# Patient Record
Sex: FEMALE | Race: WHITE | Employment: OTHER | URBAN - NONMETROPOLITAN AREA
[De-identification: names, ages, dates, MRNs, and addresses within clinical notes are randomized per-mention and may not be internally consistent; named-entity substitution may affect disease eponyms.]

---

## 2017-01-12 LAB
BASOPHILS ABSOLUTE: NORMAL /ΜL
BASOPHILS RELATIVE PERCENT: 0.94 %
EOSINOPHILS ABSOLUTE: NORMAL /ΜL
EOSINOPHILS RELATIVE PERCENT: 1.26 %
HCT VFR BLD CALC: 42 % (ref 36–46)
HEMOGLOBIN: 13.5 G/DL (ref 12–16)
LYMPHOCYTES ABSOLUTE: NORMAL /ΜL
LYMPHOCYTES RELATIVE PERCENT: 16.1 %
MCH RBC QN AUTO: 29.6 PG
MCHC RBC AUTO-ENTMCNC: 32.2 G/DL
MCV RBC AUTO: 92.2 FL
MONOCYTES ABSOLUTE: NORMAL /ΜL
MONOCYTES RELATIVE PERCENT: 0.63 %
NEUTROPHILS ABSOLUTE: NORMAL /ΜL
NEUTROPHILS RELATIVE PERCENT: 6.29 %
PDW BLD-RTO: 13.6 %
PLATELET # BLD: 196 K/ΜL
PMV BLD AUTO: 9.1 FL
RBC # BLD: 4.55 10^6/ΜL
WBC # BLD: 8.47 10^3/ML

## 2017-07-18 ENCOUNTER — TELEPHONE (OUTPATIENT)
Dept: FAMILY MEDICINE CLINIC | Age: 82
End: 2017-07-18

## 2017-07-18 ENCOUNTER — OFFICE VISIT (OUTPATIENT)
Dept: FAMILY MEDICINE CLINIC | Age: 82
End: 2017-07-18
Payer: MEDICARE

## 2017-07-18 VITALS
TEMPERATURE: 99.6 F | WEIGHT: 127 LBS | HEIGHT: 63 IN | OXYGEN SATURATION: 90 % | DIASTOLIC BLOOD PRESSURE: 80 MMHG | SYSTOLIC BLOOD PRESSURE: 110 MMHG | BODY MASS INDEX: 22.5 KG/M2

## 2017-07-18 DIAGNOSIS — E78.6 LIPOPROTEIN DEFICIENCIES: ICD-10-CM

## 2017-07-18 DIAGNOSIS — R05.9 COUGH: Primary | ICD-10-CM

## 2017-07-18 DIAGNOSIS — I10 ESSENTIAL HYPERTENSION: ICD-10-CM

## 2017-07-18 DIAGNOSIS — M85.9 DISORDER OF BONE DENSITY AND STRUCTURE, UNSPECIFIED: ICD-10-CM

## 2017-07-18 DIAGNOSIS — I50.1 LEFT VENTRICULAR FAILURE (HCC): ICD-10-CM

## 2017-07-18 DIAGNOSIS — H73.012 BULLOUS MYRINGITIS OF LEFT EAR: ICD-10-CM

## 2017-07-18 PROCEDURE — G8420 CALC BMI NORM PARAMETERS: HCPCS | Performed by: FAMILY MEDICINE

## 2017-07-18 PROCEDURE — 4040F PNEUMOC VAC/ADMIN/RCVD: CPT | Performed by: FAMILY MEDICINE

## 2017-07-18 PROCEDURE — 1036F TOBACCO NON-USER: CPT | Performed by: FAMILY MEDICINE

## 2017-07-18 PROCEDURE — 1090F PRES/ABSN URINE INCON ASSESS: CPT | Performed by: FAMILY MEDICINE

## 2017-07-18 PROCEDURE — G8427 DOCREV CUR MEDS BY ELIG CLIN: HCPCS | Performed by: FAMILY MEDICINE

## 2017-07-18 PROCEDURE — 1123F ACP DISCUSS/DSCN MKR DOCD: CPT | Performed by: FAMILY MEDICINE

## 2017-07-18 PROCEDURE — 99214 OFFICE O/P EST MOD 30 MIN: CPT | Performed by: FAMILY MEDICINE

## 2017-07-18 RX ORDER — VALSARTAN 80 MG/1
80 TABLET ORAL DAILY
COMMUNITY
End: 2017-09-09 | Stop reason: SDUPTHER

## 2017-07-18 RX ORDER — AZITHROMYCIN 250 MG/1
TABLET, FILM COATED ORAL
Qty: 1 PACKET | Refills: 0 | Status: SHIPPED | OUTPATIENT
Start: 2017-07-18 | End: 2017-07-24 | Stop reason: ALTCHOICE

## 2017-07-18 RX ORDER — METOPROLOL TARTRATE 50 MG/1
25 TABLET, FILM COATED ORAL 2 TIMES DAILY
COMMUNITY
End: 2018-02-19 | Stop reason: CLARIF

## 2017-07-18 RX ORDER — SPIRONOLACTONE 50 MG/1
25 TABLET, FILM COATED ORAL 2 TIMES DAILY
COMMUNITY
End: 2019-06-03 | Stop reason: ALTCHOICE

## 2017-07-18 RX ORDER — OMEPRAZOLE 20 MG/1
20 CAPSULE, DELAYED RELEASE ORAL DAILY
COMMUNITY
End: 2017-08-08 | Stop reason: SDUPTHER

## 2017-07-18 RX ORDER — ACETAMINOPHEN,DIPHENHYDRAMINE HCL 500; 25 MG/1; MG/1
1 TABLET, FILM COATED ORAL NIGHTLY PRN
COMMUNITY

## 2017-07-18 RX ORDER — COVID-19 ANTIGEN TEST
220 KIT MISCELLANEOUS 2 TIMES DAILY
COMMUNITY

## 2017-07-18 RX ORDER — BENZONATATE 100 MG/1
100 CAPSULE ORAL 3 TIMES DAILY PRN
Qty: 30 CAPSULE | Refills: 0 | Status: SHIPPED | OUTPATIENT
Start: 2017-07-18 | End: 2019-01-01

## 2017-07-18 RX ORDER — MESALAMINE 400 MG/1
CAPSULE, DELAYED RELEASE ORAL
COMMUNITY
Start: 2017-06-29 | End: 2018-04-02 | Stop reason: SDUPTHER

## 2017-07-18 ASSESSMENT — PATIENT HEALTH QUESTIONNAIRE - PHQ9
2. FEELING DOWN, DEPRESSED OR HOPELESS: 0
SUM OF ALL RESPONSES TO PHQ QUESTIONS 1-9: 0
1. LITTLE INTEREST OR PLEASURE IN DOING THINGS: 0
SUM OF ALL RESPONSES TO PHQ9 QUESTIONS 1 & 2: 0

## 2017-07-18 ASSESSMENT — ENCOUNTER SYMPTOMS
WHEEZING: 1
SHORTNESS OF BREATH: 1
COUGH: 1

## 2017-07-20 ENCOUNTER — TELEPHONE (OUTPATIENT)
Dept: FAMILY MEDICINE CLINIC | Age: 82
End: 2017-07-20

## 2017-07-24 ENCOUNTER — OFFICE VISIT (OUTPATIENT)
Dept: FAMILY MEDICINE CLINIC | Age: 82
End: 2017-07-24
Payer: MEDICARE

## 2017-07-24 VITALS
HEART RATE: 60 BPM | SYSTOLIC BLOOD PRESSURE: 130 MMHG | BODY MASS INDEX: 21.79 KG/M2 | WEIGHT: 123 LBS | DIASTOLIC BLOOD PRESSURE: 60 MMHG | TEMPERATURE: 98.9 F

## 2017-07-24 DIAGNOSIS — R05.9 COUGH: Primary | ICD-10-CM

## 2017-07-24 DIAGNOSIS — H73.012 BULLOUS MYRINGITIS OF LEFT EAR: ICD-10-CM

## 2017-07-24 PROCEDURE — 1036F TOBACCO NON-USER: CPT | Performed by: FAMILY MEDICINE

## 2017-07-24 PROCEDURE — 1090F PRES/ABSN URINE INCON ASSESS: CPT | Performed by: FAMILY MEDICINE

## 2017-07-24 PROCEDURE — 4040F PNEUMOC VAC/ADMIN/RCVD: CPT | Performed by: FAMILY MEDICINE

## 2017-07-24 PROCEDURE — 99212 OFFICE O/P EST SF 10 MIN: CPT | Performed by: FAMILY MEDICINE

## 2017-07-24 PROCEDURE — G8420 CALC BMI NORM PARAMETERS: HCPCS | Performed by: FAMILY MEDICINE

## 2017-07-24 PROCEDURE — 1123F ACP DISCUSS/DSCN MKR DOCD: CPT | Performed by: FAMILY MEDICINE

## 2017-07-24 PROCEDURE — G8427 DOCREV CUR MEDS BY ELIG CLIN: HCPCS | Performed by: FAMILY MEDICINE

## 2017-07-24 ASSESSMENT — ENCOUNTER SYMPTOMS
SHORTNESS OF BREATH: 1
COUGH: 1
WHEEZING: 0

## 2017-09-11 RX ORDER — VALSARTAN 80 MG/1
TABLET ORAL
Qty: 90 TABLET | Refills: 1 | Status: SHIPPED | OUTPATIENT
Start: 2017-09-11 | End: 2019-07-08 | Stop reason: ALTCHOICE

## 2017-12-02 LAB
AGE FOR GFR: 96
ANION GAP SERPL CALCULATED.3IONS-SCNC: 14 MMOL/L
APPEARANCE: ABNORMAL
BACTERIA: ABNORMAL 1HPF
BASOPHILS # BLD: 0.06 THOU/MM3
BILIRUBIN: ABNORMAL
BLOOD: ABNORMAL
BUN BLDV-MCNC: 22 MG/DL
CALCIUM SERPL-MCNC: 9.2 MG/DL
CASTS: ABNORMAL /LPF
CHLORIDE BLD-SCNC: 103 MMOL/L
CO2: 31 MMOL/L
COLOR: YELLOW
CREAT SERPL-MCNC: 1.2 MG/DL
CRYSTALS: ABNORMAL /HPF
DIFFERENTIAL: AUTOMATED DIFF
EGFR BF: 50 ML/MIN/1.73 M2
EGFR BM: 68 ML/MIN/1.73 M2
EGFR WF: 42 ML/MIN/1.73 M2
EGFR WM: 56 ML/MIN/1.73 M2
EOSINOPHIL # BLD: 0.15 THOU/MM3
EPITHELIAL CELLS, UA: ABNORMAL /HPF
GLUCOSE: 90 MG/DL
GLUCOSE: ABNORMAL MG/DL
HCT VFR BLD CALC: 36.5 %
HEMOGLOBIN: 12.1 G/DL
KETONES: ABNORMAL MG/DL
LEUKOCYTES, UA: ABNORMAL
LYMPHOCYTES # BLD: 1.28 THOU/MM3
MCH RBC QN AUTO: 31.2 PG
MCHC RBC AUTO-ENTMCNC: 33.1 G/DL
MCV RBC AUTO: 94.3 FL
MICROSCOPIC URINE: ABNORMAL
MONOCYTES # BLD: 0.55 THOU/MM3
MUCUS: ABNORMAL /HPF
NEUTROPHILS: 4.41 THOU/MM3
NITRITE, URINE: ABNORMAL
PDW BLD-RTO: 13.8 %
PH: 7.5 PH
PLATELET # BLD: 175 THOU/MM3
PMV BLD AUTO: 8.8 FL
POTASSIUM SERPL-SCNC: 4.7 MMOL/L
PROTEIN,SCREEN: ABNORMAL MG/DL
RBC # BLD: 3.87 M/UL
RBC: ABNORMAL /HPF
SODIUM BLD-SCNC: 143 MMOL/L
SPECIFIC GRAVITY, URINE: 1.01 MG/DL
URINE CULTURE, ROUTINE: NORMAL
UROBILINOGEN, URINE: 0.2 MG/DL
WBC # BLD: 6.45 THOU/ML3
WBC URINE: ABNORMAL
YEAST: ABNORMAL /HPF

## 2017-12-04 ENCOUNTER — TELEPHONE (OUTPATIENT)
Dept: FAMILY MEDICINE CLINIC | Age: 82
End: 2017-12-04

## 2017-12-04 NOTE — TELEPHONE ENCOUNTER
Leticia has a bed for [facility] whenever you are ready to discharge her. She will need discharge papers signed and Rx for Percocet. We are to contact Med Surg with when you plan to discharge her.

## 2017-12-04 NOTE — TELEPHONE ENCOUNTER
Discussed with anthony- discharge planning. Since they need written script not sent to pharmacy which could be accomplished we will arrange for discharge tomorrow.

## 2017-12-05 LAB
AGE FOR GFR: 96
CREAT SERPL-MCNC: 1.2 MG/DL
EGFR BF: 50 ML/MIN/1.73 M2
EGFR BM: 68 ML/MIN/1.73 M2
EGFR WF: 42 ML/MIN/1.73 M2
EGFR WM: 56 ML/MIN/1.73 M2

## 2018-01-01 LAB
APPEARANCE: ABNORMAL
BACTERIA: ABNORMAL 1HPF
BILIRUBIN: ABNORMAL
BLOOD: ABNORMAL
CASTS: ABNORMAL /LPF
COLOR: YELLOW
CRYSTALS: ABNORMAL /HPF
GLUCOSE: ABNORMAL MG/DL
KETONES: ABNORMAL MG/DL
LEUKOCYTES, UA: ABNORMAL
MICROSCOPIC URINE: ABNORMAL
MUCUS: ABNORMAL /HPF
NITRITE, URINE: ABNORMAL
PH: 6.5 PH
PROTEIN,SCREEN: ABNORMAL MG/DL
RBC: ABNORMAL /HPF
SPECIFIC GRAVITY, URINE: 1.01 MG/DL
URINE CULTURE, ROUTINE: NORMAL
UROBILINOGEN, URINE: 0.2 MG/DL
WBC URINE: ABNORMAL
YEAST: ABNORMAL /HPF

## 2018-02-05 ENCOUNTER — TELEPHONE (OUTPATIENT)
Dept: FAMILY MEDICINE CLINIC | Age: 83
End: 2018-02-05

## 2018-02-07 ENCOUNTER — TELEPHONE (OUTPATIENT)
Dept: FAMILY MEDICINE CLINIC | Age: 83
End: 2018-02-07

## 2018-02-19 ENCOUNTER — OFFICE VISIT (OUTPATIENT)
Dept: FAMILY MEDICINE CLINIC | Age: 83
End: 2018-02-19
Payer: MEDICARE

## 2018-02-19 VITALS — BODY MASS INDEX: 21.26 KG/M2 | SYSTOLIC BLOOD PRESSURE: 134 MMHG | DIASTOLIC BLOOD PRESSURE: 74 MMHG | WEIGHT: 120 LBS

## 2018-02-19 DIAGNOSIS — Z91.81 AT HIGH RISK FOR FALLS: ICD-10-CM

## 2018-02-19 DIAGNOSIS — S72.001D CLOSED FRACTURE OF RIGHT HIP WITH ROUTINE HEALING, SUBSEQUENT ENCOUNTER: Primary | ICD-10-CM

## 2018-02-19 DIAGNOSIS — I10 ESSENTIAL HYPERTENSION: ICD-10-CM

## 2018-02-19 DIAGNOSIS — N39.0 RECURRENT UTI: ICD-10-CM

## 2018-02-19 PROBLEM — S72.001A CLOSED FRACTURE OF RIGHT HIP (HCC): Status: ACTIVE | Noted: 2018-02-19

## 2018-02-19 PROCEDURE — 99495 TRANSJ CARE MGMT MOD F2F 14D: CPT | Performed by: FAMILY MEDICINE

## 2018-02-19 PROCEDURE — 1111F DSCHRG MED/CURRENT MED MERGE: CPT | Performed by: FAMILY MEDICINE

## 2018-02-19 RX ORDER — HYDROCODONE BITARTRATE AND ACETAMINOPHEN 5; 325 MG/1; MG/1
1 TABLET ORAL
Status: ON HOLD | COMMUNITY
End: 2018-04-24 | Stop reason: HOSPADM

## 2018-02-19 RX ORDER — LOSARTAN POTASSIUM 50 MG/1
50 TABLET ORAL DAILY
Qty: 90 TABLET | Refills: 1 | Status: SHIPPED | OUTPATIENT
Start: 2018-02-19 | End: 2018-06-29 | Stop reason: SDUPTHER

## 2018-02-19 RX ORDER — LOSARTAN POTASSIUM 50 MG/1
TABLET ORAL
COMMUNITY
Start: 2018-02-06 | End: 2018-02-19 | Stop reason: SDUPTHER

## 2018-02-19 RX ORDER — METOPROLOL SUCCINATE 50 MG/1
25 TABLET, EXTENDED RELEASE ORAL
COMMUNITY
End: 2018-06-29 | Stop reason: SDUPTHER

## 2018-02-19 ASSESSMENT — PATIENT HEALTH QUESTIONNAIRE - PHQ9
SUM OF ALL RESPONSES TO PHQ QUESTIONS 1-9: 2
1. LITTLE INTEREST OR PLEASURE IN DOING THINGS: 1
SUM OF ALL RESPONSES TO PHQ9 QUESTIONS 1 & 2: 2
2. FEELING DOWN, DEPRESSED OR HOPELESS: 1

## 2018-02-19 NOTE — PROGRESS NOTES
sleep, (TYLENOL PM EXTRA STRENGTH)  MG tablet Take 1 tablet by mouth nightly as needed for Sleep   Yes Historical Provider, MD   Multiple Vitamins-Minerals (PRESERVISION AREDS 2 PO) Take by mouth 2 times daily   Yes Historical Provider, MD   Naproxen Sodium (ALEVE) 220 MG CAPS Take 220 mg by mouth 2 times daily   Yes Historical Provider, MD   Polyethylene Glycol 3350 (MIRALAX PO) Take by mouth   Yes Historical Provider, MD   aspirin 81 MG tablet Take 81 mg by mouth daily   Yes Historical Provider, MD   DELZICOL 400 MG CPDR delayed release capsule  6/29/17  Yes Historical Provider, MD   valsartan (DIOVAN) 80 MG tablet TAKE 1 TABLET DAILY 9/11/17   Geronimo Duong MD   spironolactone (ALDACTONE) 50 MG tablet Take 25 mg by mouth 2 times daily    Historical Provider, MD   Prosthesis MISC 1 Units by Does not apply route Indications: Bilateral Breast Prostetics with bra 5/1/17   Geronimo Duong MD     Allergies   Allergen Reactions    Lotrel [Amlodipine Besy-Benazepril Hcl] Other (See Comments)     Cough       Health Maintenance   Topic Date Due    Potassium monitoring  12/02/2018    Creatinine monitoring  12/05/2018    DTaP/Tdap/Td vaccine (2 - Td) 10/16/2019    Zostavax vaccine  Completed    Flu vaccine  Completed    Pneumococcal low/med risk  Completed       Subjective:      Review of Systems   Constitutional:        Here for F/U after being in [address] [place] [address], for Rehab after hip fracture from fall at home. Currently has [facility] coming in with therapy, she enjoys having them there, kind of missing the nursing home people   Eyes: Positive for visual disturbance (Eye doctor has told her that there is not much he can do at this time). Genitourinary: Negative for frequency and urgency. She was treated twice since her fall for UTIs   Neurological: Negative for weakness.    Psychiatric/Behavioral:        Daughter reports increased memory issues       Objective:     /74   Wt 120 lb (54.4 kg) BMI 21.26 kg/m²     Physical Exam   Constitutional: She is oriented to person, place, and time. She appears well-developed and well-nourished. No distress. Using walker to ambulate   Neck: Neck supple. Cardiovascular: Normal rate and regular rhythm. No murmur heard. Pulmonary/Chest: Effort normal and breath sounds normal.   Musculoskeletal: She exhibits no edema. Lymphadenopathy:     She has no cervical adenopathy. Neurological: She is alert and oriented to person, place, and time. Assessment:     1. Closed fracture of right hip with routine healing, subsequent encounter    2. Recurrent UTI    3. Essential hypertension    4. At high risk for falls      No results found for this visit on 02/19/18. Quality & Risk Score Accuracy - MEDICARE ADVANTAGE    Visit Dx: Left heart failure (HCC)  Stable based upon symptoms and exam. Continue current treatment plan and follow up at least yearly. Last edited 02/19/18 12:59 EST by Eri Junior MD           Plan:     Orders Placed This Encounter   Procedures    AL DISCHARGE MEDS RECONCILED W/ CURRENT OUTPATIENT MED LIST       Orders Placed This Encounter   Medications    losartan (COZAAR) 50 MG tablet     Sig: Take 1 tablet by mouth daily     Dispense:  90 tablet     Refill:  1        Return in about 3 months (around 5/19/2018) for CHF, leg pain with fracture. .  Discussed use, benefit, and side effects of prescribed medications. All patient questions answered. Pt voiced understanding. Reviewed health maintenance. Instructed to continue current medications, diet and exercise. Patient agreed with treatment plan. Follow up as directed. Electronically signed by Eri Junior MD on 2/19/2018     On the basis of positive falls risk screening, assessment and plan is as follows: keep planned therapy at home.

## 2018-02-21 DIAGNOSIS — N39.0 RECURRENT UTI: Primary | ICD-10-CM

## 2018-02-21 LAB
APPEARANCE: ABNORMAL
BACTERIA: ABNORMAL 1HPF
BILIRUBIN: ABNORMAL
BLOOD: ABNORMAL
CASTS: ABNORMAL /LPF
COLOR: YELLOW
CRYSTALS: ABNORMAL /HPF
EPITHELIAL CELLS, UA: ABNORMAL /HPF
GLUCOSE: ABNORMAL MG/DL
KETONES: ABNORMAL MG/DL
LEUKOCYTES, UA: ABNORMAL
MICROSCOPIC URINE: ABNORMAL
MUCUS: ABNORMAL /HPF
NITRITE, URINE: ABNORMAL
PH: 6 PH
PROTEIN,SCREEN: ABNORMAL MG/DL
RBC: ABNORMAL /HPF
SPECIFIC GRAVITY, URINE: 1.02 MG/DL
URINE CULTURE, ROUTINE: NORMAL
UROBILINOGEN, URINE: 0.2 MG/DL
WBC URINE: ABNORMAL
YEAST: ABNORMAL /HPF

## 2018-02-23 ENCOUNTER — TELEPHONE (OUTPATIENT)
Dept: FAMILY MEDICINE CLINIC | Age: 83
End: 2018-02-23

## 2018-02-23 NOTE — TELEPHONE ENCOUNTER
Daughter called and wanted Andie Burden to be d/c from home health, she no longer wants to be homebound. She has not met her goals for PT/OT and family is aware of this.   She is being discharged

## 2018-02-26 ENCOUNTER — TELEPHONE (OUTPATIENT)
Dept: FAMILY MEDICINE CLINIC | Age: 83
End: 2018-02-26

## 2018-02-26 DIAGNOSIS — N39.0 RECURRENT UTI: Primary | ICD-10-CM

## 2018-02-26 DIAGNOSIS — N39.0 RECURRENT UTI: ICD-10-CM

## 2018-02-26 RX ORDER — SULFAMETHOXAZOLE AND TRIMETHOPRIM 800; 160 MG/1; MG/1
1 TABLET ORAL 2 TIMES DAILY
Qty: 20 TABLET | Refills: 0 | Status: SHIPPED | OUTPATIENT
Start: 2018-02-26 | End: 2019-01-01

## 2018-02-26 NOTE — TELEPHONE ENCOUNTER
Noted Valorie Doyle with multiple resistances as anticipated.   REcommend bactrim DS twice daily for 10 days and repeat urine culture in 3 weeks  thanks

## 2018-02-26 NOTE — PROGRESS NOTES
Noted, may review at planned f/u appt. Treated with culture sensativity showing multiple resistances.

## 2018-02-26 NOTE — TELEPHONE ENCOUNTER
Both daughters have called this AM wanting to make sure that her refills have all been taken care of and to ask about the Urine Culture from last week. Culture results were scanned into her chart. Daughters are requesting that Rx be sent to [facility] in [place].

## 2018-03-19 ENCOUNTER — TELEPHONE (OUTPATIENT)
Dept: FAMILY MEDICINE CLINIC | Age: 83
End: 2018-03-19

## 2018-03-19 DIAGNOSIS — N39.0 URINARY TRACT INFECTION WITHOUT HEMATURIA, SITE UNSPECIFIED: Primary | ICD-10-CM

## 2018-03-19 LAB — URINE CULTURE, ROUTINE: NORMAL

## 2018-03-19 NOTE — TELEPHONE ENCOUNTER
Per phone note 2/26/18 she was to repeat the urine culture in 3 weeks. Do you want just the culture or full ua as well.   She has \"been suffering all weekend\"

## 2018-03-21 ENCOUNTER — TELEPHONE (OUTPATIENT)
Dept: FAMILY MEDICINE CLINIC | Age: 83
End: 2018-03-21

## 2018-03-21 DIAGNOSIS — N39.0 RECURRENT UTI: Primary | ICD-10-CM

## 2018-03-21 DIAGNOSIS — N39.0 URINARY TRACT INFECTION WITHOUT HEMATURIA, SITE UNSPECIFIED: ICD-10-CM

## 2018-03-21 RX ORDER — AMOXICILLIN AND CLAVULANATE POTASSIUM 875; 125 MG/1; MG/1
1 TABLET, FILM COATED ORAL 2 TIMES DAILY
Qty: 20 TABLET | Refills: 0 | Status: SHIPPED | OUTPATIENT
Start: 2018-03-21 | End: 2019-01-01

## 2018-03-21 NOTE — TELEPHONE ENCOUNTER
Daughter Hermann Fallon called about Urine Culture results. They were not in the chart when she called, I did get them off the fax and scan into her chart, please advise of orders. Thank you.

## 2018-03-21 NOTE — PROGRESS NOTES
Pt just completed bactrim and is still showing sensativity. Recommend course of augmentin and referral to urology with infection recurring so rapidly. Orders placed.    Thanks

## 2018-04-02 ENCOUNTER — OFFICE VISIT (OUTPATIENT)
Dept: UROLOGY | Age: 83
End: 2018-04-02
Payer: MEDICARE

## 2018-04-02 VITALS
BODY MASS INDEX: 23.6 KG/M2 | HEIGHT: 63 IN | WEIGHT: 133.2 LBS | DIASTOLIC BLOOD PRESSURE: 78 MMHG | SYSTOLIC BLOOD PRESSURE: 132 MMHG | HEART RATE: 76 BPM

## 2018-04-02 DIAGNOSIS — N39.3 STRESS INCONTINENCE: ICD-10-CM

## 2018-04-02 DIAGNOSIS — N39.0 RECURRENT UTI: Primary | ICD-10-CM

## 2018-04-02 DIAGNOSIS — Z87.442 HISTORY OF KIDNEY STONES: ICD-10-CM

## 2018-04-02 PROCEDURE — G8427 DOCREV CUR MEDS BY ELIG CLIN: HCPCS | Performed by: UROLOGY

## 2018-04-02 PROCEDURE — 4040F PNEUMOC VAC/ADMIN/RCVD: CPT | Performed by: UROLOGY

## 2018-04-02 PROCEDURE — 1036F TOBACCO NON-USER: CPT | Performed by: UROLOGY

## 2018-04-02 PROCEDURE — 99204 OFFICE O/P NEW MOD 45 MIN: CPT | Performed by: UROLOGY

## 2018-04-02 PROCEDURE — G8420 CALC BMI NORM PARAMETERS: HCPCS | Performed by: UROLOGY

## 2018-04-02 PROCEDURE — 1090F PRES/ABSN URINE INCON ASSESS: CPT | Performed by: UROLOGY

## 2018-04-02 PROCEDURE — 1123F ACP DISCUSS/DSCN MKR DOCD: CPT | Performed by: UROLOGY

## 2018-04-02 RX ORDER — MESALAMINE 400 MG/1
CAPSULE, DELAYED RELEASE ORAL
Qty: 540 CAPSULE | Refills: 1 | Status: SHIPPED | OUTPATIENT
Start: 2018-04-02 | End: 2018-10-01 | Stop reason: SDUPTHER

## 2018-04-10 ENCOUNTER — HOSPITAL ENCOUNTER (OUTPATIENT)
Dept: CT IMAGING | Age: 83
Discharge: HOME | End: 2018-04-12
Payer: MEDICARE

## 2018-04-10 DIAGNOSIS — Z87.442 HISTORY OF KIDNEY STONES: ICD-10-CM

## 2018-04-10 PROCEDURE — 74176 CT ABD & PELVIS W/O CONTRAST: CPT

## 2018-04-16 ENCOUNTER — OFFICE VISIT (OUTPATIENT)
Dept: FAMILY MEDICINE CLINIC | Age: 83
End: 2018-04-16
Payer: MEDICARE

## 2018-04-16 VITALS
DIASTOLIC BLOOD PRESSURE: 84 MMHG | TEMPERATURE: 98.9 F | RESPIRATION RATE: 14 BRPM | BODY MASS INDEX: 22.87 KG/M2 | SYSTOLIC BLOOD PRESSURE: 126 MMHG | WEIGHT: 129.1 LBS | HEART RATE: 64 BPM

## 2018-04-16 DIAGNOSIS — R35.0 FREQUENCY OF URINATION: ICD-10-CM

## 2018-04-16 DIAGNOSIS — N39.0 URINARY TRACT INFECTION WITHOUT HEMATURIA, SITE UNSPECIFIED: Primary | ICD-10-CM

## 2018-04-16 DIAGNOSIS — N39.0 RECURRENT UTI: ICD-10-CM

## 2018-04-16 LAB
APPEARANCE FLUID: ABNORMAL
BILIRUBIN, POC: ABNORMAL
BLOOD URINE, POC: ABNORMAL
CLARITY, POC: CLEAR
COLOR, POC: ABNORMAL
GLUCOSE URINE, POC: ABNORMAL
KETONES, POC: ABNORMAL
LEUKOCYTE EST, POC: ABNORMAL
NITRITE, POC: POSITIVE
PH, POC: 6
PROTEIN, POC: ABNORMAL
SPECIFIC GRAVITY, POC: 1
URINE CULTURE, ROUTINE: NORMAL
UROBILINOGEN, POC: ABNORMAL

## 2018-04-16 PROCEDURE — 1036F TOBACCO NON-USER: CPT | Performed by: NURSE PRACTITIONER

## 2018-04-16 PROCEDURE — 1123F ACP DISCUSS/DSCN MKR DOCD: CPT | Performed by: NURSE PRACTITIONER

## 2018-04-16 PROCEDURE — G8420 CALC BMI NORM PARAMETERS: HCPCS | Performed by: NURSE PRACTITIONER

## 2018-04-16 PROCEDURE — 81002 URINALYSIS NONAUTO W/O SCOPE: CPT | Performed by: NURSE PRACTITIONER

## 2018-04-16 PROCEDURE — 1090F PRES/ABSN URINE INCON ASSESS: CPT | Performed by: NURSE PRACTITIONER

## 2018-04-16 PROCEDURE — G8427 DOCREV CUR MEDS BY ELIG CLIN: HCPCS | Performed by: NURSE PRACTITIONER

## 2018-04-16 PROCEDURE — 99213 OFFICE O/P EST LOW 20 MIN: CPT | Performed by: NURSE PRACTITIONER

## 2018-04-16 PROCEDURE — 4040F PNEUMOC VAC/ADMIN/RCVD: CPT | Performed by: NURSE PRACTITIONER

## 2018-04-16 ASSESSMENT — ENCOUNTER SYMPTOMS
NAUSEA: 0
WHEEZING: 0
DIARRHEA: 1
BLOOD IN STOOL: 0
SHORTNESS OF BREATH: 0
COUGH: 0

## 2018-04-19 DIAGNOSIS — N39.0 URINARY TRACT INFECTION WITHOUT HEMATURIA, SITE UNSPECIFIED: Primary | ICD-10-CM

## 2018-04-19 RX ORDER — NITROFURANTOIN 25; 75 MG/1; MG/1
100 CAPSULE ORAL 2 TIMES DAILY
Qty: 14 CAPSULE | Refills: 0 | Status: SHIPPED | OUTPATIENT
Start: 2018-04-19 | End: 2019-01-01

## 2018-04-20 ENCOUNTER — TELEPHONE (OUTPATIENT)
Dept: FAMILY MEDICINE CLINIC | Age: 83
End: 2018-04-20

## 2018-04-23 ENCOUNTER — PROCEDURE VISIT (OUTPATIENT)
Dept: UROLOGY | Age: 83
End: 2018-04-23
Payer: MEDICARE

## 2018-04-23 VITALS
DIASTOLIC BLOOD PRESSURE: 86 MMHG | OXYGEN SATURATION: 90 % | HEART RATE: 71 BPM | BODY MASS INDEX: 22.86 KG/M2 | HEIGHT: 63 IN | WEIGHT: 129 LBS | SYSTOLIC BLOOD PRESSURE: 122 MMHG

## 2018-04-23 DIAGNOSIS — N39.0 RECURRENT UTI: ICD-10-CM

## 2018-04-23 PROCEDURE — 52000 CYSTOURETHROSCOPY: CPT | Performed by: UROLOGY

## 2018-04-23 PROCEDURE — 99999 PR CYSTOURETHROSCOPY: CPT | Performed by: UROLOGY

## 2018-04-24 ENCOUNTER — APPOINTMENT (OUTPATIENT)
Dept: GENERAL RADIOLOGY | Age: 83
End: 2018-04-24
Attending: UROLOGY
Payer: MEDICARE

## 2018-04-24 ENCOUNTER — ANESTHESIA (OUTPATIENT)
Dept: OPERATING ROOM | Age: 83
End: 2018-04-24
Payer: MEDICARE

## 2018-04-24 ENCOUNTER — ANESTHESIA EVENT (OUTPATIENT)
Dept: OPERATING ROOM | Age: 83
End: 2018-04-24
Payer: MEDICARE

## 2018-04-24 ENCOUNTER — HOSPITAL ENCOUNTER (OUTPATIENT)
Age: 83
Setting detail: OUTPATIENT SURGERY
Discharge: HOME | End: 2018-04-24
Attending: UROLOGY | Admitting: UROLOGY
Payer: MEDICARE

## 2018-04-24 VITALS
WEIGHT: 129 LBS | RESPIRATION RATE: 16 BRPM | TEMPERATURE: 97.2 F | HEART RATE: 80 BPM | HEIGHT: 62 IN | SYSTOLIC BLOOD PRESSURE: 133 MMHG | OXYGEN SATURATION: 95 % | DIASTOLIC BLOOD PRESSURE: 76 MMHG | BODY MASS INDEX: 23.74 KG/M2

## 2018-04-24 VITALS
OXYGEN SATURATION: 98 % | RESPIRATION RATE: 25 BRPM | TEMPERATURE: 98.2 F | SYSTOLIC BLOOD PRESSURE: 117 MMHG | DIASTOLIC BLOOD PRESSURE: 59 MMHG

## 2018-04-24 DIAGNOSIS — N20.0 KIDNEY STONE: Primary | ICD-10-CM

## 2018-04-24 PROCEDURE — 7100000011 HC PHASE II RECOVERY - ADDTL 15 MIN: Performed by: UROLOGY

## 2018-04-24 PROCEDURE — 6370000000 HC RX 637 (ALT 250 FOR IP): Performed by: NURSE ANESTHETIST, CERTIFIED REGISTERED

## 2018-04-24 PROCEDURE — C1773 RET DEV, INSERTABLE: HCPCS | Performed by: UROLOGY

## 2018-04-24 PROCEDURE — 3600000012 HC SURGERY LEVEL 2 ADDTL 15MIN: Performed by: UROLOGY

## 2018-04-24 PROCEDURE — 3700000001 HC ADD 15 MINUTES (ANESTHESIA): Performed by: UROLOGY

## 2018-04-24 PROCEDURE — 3600000002 HC SURGERY LEVEL 2 BASE: Performed by: UROLOGY

## 2018-04-24 PROCEDURE — 82365 CALCULUS SPECTROSCOPY: CPT

## 2018-04-24 PROCEDURE — 2580000003 HC RX 258: Performed by: UROLOGY

## 2018-04-24 PROCEDURE — C1769 GUIDE WIRE: HCPCS | Performed by: UROLOGY

## 2018-04-24 PROCEDURE — C2617 STENT, NON-COR, TEM W/O DEL: HCPCS | Performed by: UROLOGY

## 2018-04-24 PROCEDURE — 6360000002 HC RX W HCPCS: Performed by: UROLOGY

## 2018-04-24 PROCEDURE — 3209999900 FLUORO FOR SURGICAL PROCEDURES

## 2018-04-24 PROCEDURE — 6360000002 HC RX W HCPCS: Performed by: NURSE ANESTHETIST, CERTIFIED REGISTERED

## 2018-04-24 PROCEDURE — 74018 RADEX ABDOMEN 1 VIEW: CPT

## 2018-04-24 PROCEDURE — 6370000000 HC RX 637 (ALT 250 FOR IP)

## 2018-04-24 PROCEDURE — 7100000010 HC PHASE II RECOVERY - FIRST 15 MIN: Performed by: UROLOGY

## 2018-04-24 PROCEDURE — 6360000002 HC RX W HCPCS

## 2018-04-24 PROCEDURE — 3700000000 HC ANESTHESIA ATTENDED CARE: Performed by: UROLOGY

## 2018-04-24 PROCEDURE — 00910 ANES TRANSURETHRAL PX NOS: CPT | Performed by: NURSE ANESTHETIST, CERTIFIED REGISTERED

## 2018-04-24 DEVICE — STENT URET 6FR L26CM PERCFLX HYDR+ TAPR TIP GRAD: Type: IMPLANTABLE DEVICE | Site: URETER | Status: FUNCTIONAL

## 2018-04-24 RX ORDER — OXYBUTYNIN CHLORIDE 10 MG/1
10 TABLET, EXTENDED RELEASE ORAL DAILY
Qty: 7 TABLET | Refills: 0 | Status: SHIPPED | OUTPATIENT
Start: 2018-04-24 | End: 2018-09-10 | Stop reason: CLARIF

## 2018-04-24 RX ORDER — SODIUM CHLORIDE, SODIUM LACTATE, POTASSIUM CHLORIDE, CALCIUM CHLORIDE 600; 310; 30; 20 MG/100ML; MG/100ML; MG/100ML; MG/100ML
INJECTION, SOLUTION INTRAVENOUS CONTINUOUS
Status: DISCONTINUED | OUTPATIENT
Start: 2018-04-24 | End: 2018-04-24 | Stop reason: HOSPADM

## 2018-04-24 RX ORDER — PROPOFOL 10 MG/ML
INJECTION, EMULSION INTRAVENOUS PRN
Status: DISCONTINUED | OUTPATIENT
Start: 2018-04-24 | End: 2018-04-24 | Stop reason: SDUPTHER

## 2018-04-24 RX ORDER — HYDROCODONE BITARTRATE AND ACETAMINOPHEN 5; 325 MG/1; MG/1
1 TABLET ORAL EVERY 6 HOURS PRN
Qty: 10 TABLET | Refills: 0 | Status: SHIPPED | OUTPATIENT
Start: 2018-04-24 | End: 2019-01-01

## 2018-04-24 RX ADMIN — SODIUM CHLORIDE, SODIUM LACTATE, POTASSIUM CHLORIDE, AND CALCIUM CHLORIDE: .6; .31; .03; .02 INJECTION, SOLUTION INTRAVENOUS at 07:11

## 2018-04-24 RX ADMIN — PROPOFOL 250 MG: 10 INJECTION, EMULSION INTRAVENOUS at 07:39

## 2018-04-24 RX ADMIN — ACETAMINOPHEN 650 MG: 325 SUPPOSITORY RECTAL at 07:39

## 2018-04-24 RX ADMIN — GENTAMICIN SULFATE 80 MG: 40 INJECTION, SOLUTION INTRAMUSCULAR; INTRAVENOUS at 07:30

## 2018-04-24 ASSESSMENT — PAIN DESCRIPTION - PAIN TYPE
TYPE: SURGICAL PAIN
TYPE: SURGICAL PAIN

## 2018-04-24 ASSESSMENT — PAIN SCALES - GENERAL
PAINLEVEL_OUTOF10: 2
PAINLEVEL_OUTOF10: 2

## 2018-04-24 ASSESSMENT — PAIN - FUNCTIONAL ASSESSMENT: PAIN_FUNCTIONAL_ASSESSMENT: 0-10

## 2018-04-25 ASSESSMENT — ENCOUNTER SYMPTOMS: ABDOMINAL PAIN: 0

## 2018-04-28 LAB
STONE COMPOSITION: NORMAL
STONE DESCRIPTION: NORMAL
STONE MASS: 30 MG
STONE NUMBER: 1
STONE SIZE: NORMAL MM

## 2018-05-21 ENCOUNTER — OFFICE VISIT (OUTPATIENT)
Dept: FAMILY MEDICINE CLINIC | Age: 83
End: 2018-05-21
Payer: MEDICARE

## 2018-05-21 VITALS
SYSTOLIC BLOOD PRESSURE: 120 MMHG | DIASTOLIC BLOOD PRESSURE: 60 MMHG | WEIGHT: 131 LBS | HEART RATE: 68 BPM | BODY MASS INDEX: 23.96 KG/M2

## 2018-05-21 DIAGNOSIS — R35.1 NOCTURIA: ICD-10-CM

## 2018-05-21 DIAGNOSIS — I50.1 LEFT VENTRICULAR FAILURE (HCC): ICD-10-CM

## 2018-05-21 DIAGNOSIS — I10 ESSENTIAL HYPERTENSION: Primary | ICD-10-CM

## 2018-05-21 DIAGNOSIS — N39.0 RECURRENT UTI: ICD-10-CM

## 2018-05-21 PROCEDURE — 4040F PNEUMOC VAC/ADMIN/RCVD: CPT | Performed by: FAMILY MEDICINE

## 2018-05-21 PROCEDURE — 1036F TOBACCO NON-USER: CPT | Performed by: FAMILY MEDICINE

## 2018-05-21 PROCEDURE — G8420 CALC BMI NORM PARAMETERS: HCPCS | Performed by: FAMILY MEDICINE

## 2018-05-21 PROCEDURE — 1123F ACP DISCUSS/DSCN MKR DOCD: CPT | Performed by: FAMILY MEDICINE

## 2018-05-21 PROCEDURE — G8427 DOCREV CUR MEDS BY ELIG CLIN: HCPCS | Performed by: FAMILY MEDICINE

## 2018-05-21 PROCEDURE — 99214 OFFICE O/P EST MOD 30 MIN: CPT | Performed by: FAMILY MEDICINE

## 2018-05-21 PROCEDURE — 1090F PRES/ABSN URINE INCON ASSESS: CPT | Performed by: FAMILY MEDICINE

## 2018-05-21 ASSESSMENT — ENCOUNTER SYMPTOMS
DIARRHEA: 0
ABDOMINAL PAIN: 0
CONSTIPATION: 0

## 2018-05-22 LAB — URINE CULTURE, ROUTINE: NORMAL

## 2018-05-28 ENCOUNTER — PATIENT MESSAGE (OUTPATIENT)
Dept: FAMILY MEDICINE CLINIC | Age: 83
End: 2018-05-28

## 2018-05-29 DIAGNOSIS — N39.0 RECURRENT UTI: Primary | ICD-10-CM

## 2018-05-29 RX ORDER — CIPROFLOXACIN 500 MG/1
500 TABLET, FILM COATED ORAL 2 TIMES DAILY
Qty: 20 TABLET | Refills: 0 | Status: SHIPPED | OUTPATIENT
Start: 2018-05-29 | End: 2019-01-01

## 2018-06-04 ENCOUNTER — OFFICE VISIT (OUTPATIENT)
Dept: UROLOGY | Age: 83
End: 2018-06-04
Payer: MEDICARE

## 2018-06-04 VITALS
DIASTOLIC BLOOD PRESSURE: 62 MMHG | OXYGEN SATURATION: 92 % | SYSTOLIC BLOOD PRESSURE: 122 MMHG | WEIGHT: 131 LBS | BODY MASS INDEX: 24.11 KG/M2 | HEIGHT: 62 IN | HEART RATE: 62 BPM

## 2018-06-04 DIAGNOSIS — Z87.442 HISTORY OF KIDNEY STONES: ICD-10-CM

## 2018-06-04 DIAGNOSIS — R32 URINARY INCONTINENCE, UNSPECIFIED TYPE: ICD-10-CM

## 2018-06-04 DIAGNOSIS — N39.0 RECURRENT UTI: Primary | ICD-10-CM

## 2018-06-04 PROCEDURE — 1123F ACP DISCUSS/DSCN MKR DOCD: CPT | Performed by: UROLOGY

## 2018-06-04 PROCEDURE — 1090F PRES/ABSN URINE INCON ASSESS: CPT | Performed by: UROLOGY

## 2018-06-04 PROCEDURE — 99214 OFFICE O/P EST MOD 30 MIN: CPT | Performed by: UROLOGY

## 2018-06-04 PROCEDURE — G8420 CALC BMI NORM PARAMETERS: HCPCS | Performed by: UROLOGY

## 2018-06-04 PROCEDURE — G8427 DOCREV CUR MEDS BY ELIG CLIN: HCPCS | Performed by: UROLOGY

## 2018-06-04 PROCEDURE — 1036F TOBACCO NON-USER: CPT | Performed by: UROLOGY

## 2018-06-04 PROCEDURE — 4040F PNEUMOC VAC/ADMIN/RCVD: CPT | Performed by: UROLOGY

## 2018-06-04 RX ORDER — TRIMETHOPRIM 100 MG/1
100 TABLET ORAL DAILY
Qty: 30 TABLET | Refills: 11 | Status: SHIPPED | OUTPATIENT
Start: 2018-06-04 | End: 2019-01-01

## 2018-06-29 DIAGNOSIS — I10 ESSENTIAL HYPERTENSION: ICD-10-CM

## 2018-06-29 RX ORDER — METOPROLOL SUCCINATE 50 MG/1
25 TABLET, EXTENDED RELEASE ORAL DAILY
Qty: 90 TABLET | Refills: 0 | Status: SHIPPED | OUTPATIENT
Start: 2018-06-29 | End: 2018-11-09 | Stop reason: SDUPTHER

## 2018-06-29 RX ORDER — OMEPRAZOLE 20 MG/1
20 CAPSULE, DELAYED RELEASE ORAL DAILY
Qty: 90 CAPSULE | Refills: 0 | Status: SHIPPED | OUTPATIENT
Start: 2018-06-29 | End: 2018-10-08 | Stop reason: SDUPTHER

## 2018-06-29 RX ORDER — LOSARTAN POTASSIUM 50 MG/1
50 TABLET ORAL DAILY
Qty: 90 TABLET | Refills: 0 | Status: SHIPPED | OUTPATIENT
Start: 2018-06-29 | End: 2018-07-26 | Stop reason: SDUPTHER

## 2018-07-06 ENCOUNTER — TELEPHONE (OUTPATIENT)
Dept: FAMILY MEDICINE CLINIC | Age: 83
End: 2018-07-06

## 2018-07-26 DIAGNOSIS — I10 ESSENTIAL HYPERTENSION: ICD-10-CM

## 2018-07-26 RX ORDER — LOSARTAN POTASSIUM 50 MG/1
50 TABLET ORAL DAILY
Qty: 90 TABLET | Refills: 1 | Status: SHIPPED | OUTPATIENT
Start: 2018-07-26 | End: 2019-01-23 | Stop reason: SDUPTHER

## 2018-09-04 DIAGNOSIS — N39.0 RECURRENT UTI: Primary | ICD-10-CM

## 2018-09-09 RX ORDER — TRIMETHOPRIM 100 MG/1
100 TABLET ORAL DAILY
Qty: 90 TABLET | Refills: 3 | Status: SHIPPED | OUTPATIENT
Start: 2018-09-09 | End: 2018-09-10 | Stop reason: SDUPTHER

## 2018-09-10 ENCOUNTER — OFFICE VISIT (OUTPATIENT)
Dept: UROLOGY | Age: 83
End: 2018-09-10
Payer: MEDICARE

## 2018-09-10 VITALS
WEIGHT: 131 LBS | SYSTOLIC BLOOD PRESSURE: 120 MMHG | BODY MASS INDEX: 24.11 KG/M2 | HEIGHT: 62 IN | DIASTOLIC BLOOD PRESSURE: 82 MMHG | HEART RATE: 88 BPM | OXYGEN SATURATION: 91 %

## 2018-09-10 DIAGNOSIS — N39.0 RECURRENT UTI: ICD-10-CM

## 2018-09-10 DIAGNOSIS — R10.9 ABDOMINAL PAIN, UNSPECIFIED ABDOMINAL LOCATION: ICD-10-CM

## 2018-09-10 DIAGNOSIS — Z87.442 HISTORY OF KIDNEY STONES: Primary | ICD-10-CM

## 2018-09-10 PROCEDURE — 1101F PT FALLS ASSESS-DOCD LE1/YR: CPT | Performed by: UROLOGY

## 2018-09-10 PROCEDURE — 1090F PRES/ABSN URINE INCON ASSESS: CPT | Performed by: UROLOGY

## 2018-09-10 PROCEDURE — 1123F ACP DISCUSS/DSCN MKR DOCD: CPT | Performed by: UROLOGY

## 2018-09-10 PROCEDURE — G8427 DOCREV CUR MEDS BY ELIG CLIN: HCPCS | Performed by: UROLOGY

## 2018-09-10 PROCEDURE — 99214 OFFICE O/P EST MOD 30 MIN: CPT | Performed by: UROLOGY

## 2018-09-10 PROCEDURE — 1036F TOBACCO NON-USER: CPT | Performed by: UROLOGY

## 2018-09-10 PROCEDURE — G8420 CALC BMI NORM PARAMETERS: HCPCS | Performed by: UROLOGY

## 2018-09-10 PROCEDURE — 4040F PNEUMOC VAC/ADMIN/RCVD: CPT | Performed by: UROLOGY

## 2018-09-10 RX ORDER — TRIMETHOPRIM 100 MG/1
100 TABLET ORAL DAILY
Qty: 90 TABLET | Refills: 3 | Status: SHIPPED | OUTPATIENT
Start: 2018-09-10 | End: 2019-01-01 | Stop reason: SDUPTHER

## 2018-09-10 NOTE — PROGRESS NOTES
[address] [facility]  Dept: [phone]  Dept Fax: [phone]  Loc: [phone]     Emilie Morrow, [address] Urology Office Note -  Follow-up Patient    Patient:  August Thibodeaux  YOB: 1921  Date: 9/12/2018    The patient is a 80 y.o. female who presents today for evaluation of the following problems:  recurrent UTIs, history of kidney stones  Chief Complaint   Patient presents with    Other     3 months post right ureteroscopy     referred/consultation requested by David Pitts MD.    HISTORY OF PRESENT ILLNESS:     Onset was   Years ago  Overall, the problem(s) are better. Severity is described as moderate to severe. Associated Symptoms: No dysuria, no gross hematuria. Current Pain Severity: 0    Had sepsis in [place] in February 2014 secondary to obstructing stone and was stented at that time--had c diff afterwards. 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  I recently performed ureteroscopy for an obstructing UVJ stone. presents today with no new issues. Denies symptoms but does complain of foul smelling urine. Urinary tract infections are recurrent but have not happened since last office visit. The patient also complains of associated stress urinary incontinence. Requested/reviewed records from David Pitts MD office and/or outside physician/EMR    (Patient's old records have been requested, reviewed and pertinent findings summarized in today's note.)    Procedures Today: N/A    Last several PSA's:  No results found for: PSA    Last total testosterone:  No results found for: TESTOSTERONE    Urinalysis today:  No results found for this visit on 09/10/18.     Last BUN and creatinine:  Lab Results   Component Value Date    BUN 22 (H) 12/02/2017     Lab Results   Component Value Date    CREATININE 1.2 (H) 12/05/2017       Additional Lab/Culture results:   See Olga Major

## 2018-09-20 ENCOUNTER — OFFICE VISIT (OUTPATIENT)
Dept: FAMILY MEDICINE CLINIC | Age: 83
End: 2018-09-20
Payer: MEDICARE

## 2018-09-20 VITALS
DIASTOLIC BLOOD PRESSURE: 72 MMHG | WEIGHT: 131 LBS | BODY MASS INDEX: 24.11 KG/M2 | HEIGHT: 62 IN | HEART RATE: 76 BPM | SYSTOLIC BLOOD PRESSURE: 130 MMHG

## 2018-09-20 DIAGNOSIS — I10 ESSENTIAL HYPERTENSION: Primary | ICD-10-CM

## 2018-09-20 DIAGNOSIS — E78.6 LIPOPROTEIN DEFICIENCY DISORDER: ICD-10-CM

## 2018-09-20 DIAGNOSIS — M85.9 DISORDER OF BONE DENSITY AND STRUCTURE, UNSPECIFIED: ICD-10-CM

## 2018-09-20 DIAGNOSIS — Z23 NEED FOR PROPHYLACTIC VACCINATION AND INOCULATION AGAINST INFLUENZA: ICD-10-CM

## 2018-09-20 PROCEDURE — G8428 CUR MEDS NOT DOCUMENT: HCPCS | Performed by: FAMILY MEDICINE

## 2018-09-20 PROCEDURE — 90662 IIV NO PRSV INCREASED AG IM: CPT | Performed by: FAMILY MEDICINE

## 2018-09-20 PROCEDURE — G8420 CALC BMI NORM PARAMETERS: HCPCS | Performed by: FAMILY MEDICINE

## 2018-09-20 PROCEDURE — 1123F ACP DISCUSS/DSCN MKR DOCD: CPT | Performed by: FAMILY MEDICINE

## 2018-09-20 PROCEDURE — 1036F TOBACCO NON-USER: CPT | Performed by: FAMILY MEDICINE

## 2018-09-20 PROCEDURE — 1090F PRES/ABSN URINE INCON ASSESS: CPT | Performed by: FAMILY MEDICINE

## 2018-09-20 PROCEDURE — G0008 ADMIN INFLUENZA VIRUS VAC: HCPCS | Performed by: FAMILY MEDICINE

## 2018-09-20 PROCEDURE — 1101F PT FALLS ASSESS-DOCD LE1/YR: CPT | Performed by: FAMILY MEDICINE

## 2018-09-20 PROCEDURE — 4040F PNEUMOC VAC/ADMIN/RCVD: CPT | Performed by: FAMILY MEDICINE

## 2018-09-20 PROCEDURE — 99214 OFFICE O/P EST MOD 30 MIN: CPT | Performed by: FAMILY MEDICINE

## 2018-09-20 ASSESSMENT — ENCOUNTER SYMPTOMS: SHORTNESS OF BREATH: 0

## 2018-09-20 NOTE — PROGRESS NOTES
[facility]  [address] [facility]  Dept: [phone]  Dept Fax: [phone]    Lawanda Chavez is a 80 y.o. female who presents today for her medical conditions/complaints as noted below.   Lawanda Chavez is c/o of 3 Month Follow-Up and Hypertension            HPI:     HPI  Pt here for followup  Had kidney stone and saw Dr Avel Fulton- resolved with stent  Taking medication regularly  Trying to drink well     Following with Dr Avel Fulton and Cardiology  Using low dose prophylactic antibiotic to avoid recurring UTI's    BP Readings from Last 3 Encounters:   09/20/18 130/72   09/10/18 120/82   06/04/18 122/62          (goal 120/80)    Past Medical History:   Diagnosis Date    Cancer ([facility] [address]     bilat mastectomy    Congenital heart disease     Hyperlipidemia     Hypertension       Past Surgical History:   Procedure Laterality Date    CHOLECYSTECTOMY      FRACTURE SURGERY      sacrum    JOINT REPLACEMENT      MASTECTOMY, BILATERAL      OR CYSTOSCOPY,INSERT URETERAL STENT Right 4/24/2018    Cysto RIGHT  ureteroscopy RIGHT STENTPLACEMENT  stone BASKETING performed by Alecia Tejada MD at [place] [address] History   Problem Relation Age of Onset    Cancer Mother     High Blood Pressure Mother     Cancer Father     Cancer Paternal Grandmother        Social History   Substance Use Topics    Smoking status: Never Smoker    Smokeless tobacco: Never Used    Alcohol use No      Current Outpatient Prescriptions   Medication Sig Dispense Refill    trimethoprim (TRIMPEX) 100 MG tablet Take 1 tablet by mouth daily 90 tablet 3    losartan (COZAAR) 50 MG tablet Take 1 tablet by mouth daily 90 tablet 1    metoprolol succinate (TOPROL XL) 50 MG extended release tablet Take 0.5 tablets by mouth daily 90 tablet 0    omeprazole (PRILOSEC) 20 MG delayed release capsule Take 1 capsule by mouth Daily 90 capsule 0    DELZICOL 400 MG CPDR delayed release capsule 2 caps  capsule 1

## 2018-10-01 RX ORDER — MESALAMINE 400 MG/1
CAPSULE, DELAYED RELEASE ORAL
Qty: 540 CAPSULE | Refills: 1 | Status: SHIPPED | OUTPATIENT
Start: 2018-10-01 | End: 2019-03-21 | Stop reason: SDUPTHER

## 2018-10-01 NOTE — TELEPHONE ENCOUNTER
From: Brendan Garcia  Sent: 9/29/2018 7:24 AM EDT  Subject: Medication Renewal Request    Fabiola Dsouza would like a refill of the following medications:     DELZICOL 400 MG CPDR delayed release capsule Milad Soni MD]   Patient Comment: Order through [facility]. Thank you.      Preferred pharmacy: [address] [place] [address] , [address] [facility] [phone] - F [phone]

## 2018-10-08 RX ORDER — OMEPRAZOLE 20 MG/1
20 CAPSULE, DELAYED RELEASE ORAL DAILY
Qty: 90 CAPSULE | Refills: 1 | Status: SHIPPED | OUTPATIENT
Start: 2018-10-08 | End: 2019-01-23 | Stop reason: SDUPTHER

## 2018-11-09 RX ORDER — METOPROLOL SUCCINATE 50 MG/1
25 TABLET, EXTENDED RELEASE ORAL DAILY
Qty: 90 TABLET | Refills: 1 | Status: SHIPPED | OUTPATIENT
Start: 2018-11-09 | End: 2018-11-20 | Stop reason: SDUPTHER

## 2019-01-01 ENCOUNTER — OFFICE VISIT (OUTPATIENT)
Dept: FAMILY MEDICINE CLINIC | Age: 84
End: 2019-01-01
Payer: MEDICARE

## 2019-01-01 ENCOUNTER — TELEPHONE (OUTPATIENT)
Dept: FAMILY MEDICINE CLINIC | Age: 84
End: 2019-01-01

## 2019-01-01 ENCOUNTER — PATIENT MESSAGE (OUTPATIENT)
Dept: FAMILY MEDICINE CLINIC | Age: 84
End: 2019-01-01

## 2019-01-01 VITALS
OXYGEN SATURATION: 97 % | WEIGHT: 126 LBS | BODY MASS INDEX: 23.79 KG/M2 | HEIGHT: 61 IN | HEART RATE: 62 BPM | SYSTOLIC BLOOD PRESSURE: 104 MMHG | DIASTOLIC BLOOD PRESSURE: 60 MMHG

## 2019-01-01 VITALS
DIASTOLIC BLOOD PRESSURE: 84 MMHG | WEIGHT: 128 LBS | HEART RATE: 70 BPM | OXYGEN SATURATION: 90 % | SYSTOLIC BLOOD PRESSURE: 126 MMHG | TEMPERATURE: 100.2 F | BODY MASS INDEX: 24.59 KG/M2

## 2019-01-01 VITALS
HEART RATE: 62 BPM | SYSTOLIC BLOOD PRESSURE: 100 MMHG | DIASTOLIC BLOOD PRESSURE: 62 MMHG | WEIGHT: 127 LBS | BODY MASS INDEX: 23.98 KG/M2 | HEIGHT: 61 IN | TEMPERATURE: 99.2 F

## 2019-01-01 VITALS
HEIGHT: 61 IN | OXYGEN SATURATION: 92 % | HEART RATE: 60 BPM | BODY MASS INDEX: 23.22 KG/M2 | SYSTOLIC BLOOD PRESSURE: 110 MMHG | DIASTOLIC BLOOD PRESSURE: 70 MMHG | WEIGHT: 123 LBS

## 2019-01-01 DIAGNOSIS — R53.82 CHRONIC FATIGUE: ICD-10-CM

## 2019-01-01 DIAGNOSIS — I10 ESSENTIAL HYPERTENSION: ICD-10-CM

## 2019-01-01 DIAGNOSIS — Z95.0 PACEMAKER: ICD-10-CM

## 2019-01-01 DIAGNOSIS — R19.7 DIARRHEA, UNSPECIFIED TYPE: ICD-10-CM

## 2019-01-01 DIAGNOSIS — R15.9 INCONTINENCE OF FECES, UNSPECIFIED FECAL INCONTINENCE TYPE: ICD-10-CM

## 2019-01-01 DIAGNOSIS — R19.7 DIARRHEA, UNSPECIFIED TYPE: Primary | ICD-10-CM

## 2019-01-01 DIAGNOSIS — I42.9 CARDIOMYOPATHY, UNSPECIFIED TYPE (HCC): ICD-10-CM

## 2019-01-01 DIAGNOSIS — R09.89 RALES 1/4 WAY UP POSTERIOR CHEST WALL ON RIGHT SIDE: Primary | ICD-10-CM

## 2019-01-01 DIAGNOSIS — I49.5 SINOATRIAL NODE DYSFUNCTION (HCC): ICD-10-CM

## 2019-01-01 DIAGNOSIS — I35.1 NONRHEUMATIC AORTIC VALVE INSUFFICIENCY: ICD-10-CM

## 2019-01-01 DIAGNOSIS — N39.0 RECURRENT UTI: ICD-10-CM

## 2019-01-01 DIAGNOSIS — G45.9 TIA (TRANSIENT ISCHEMIC ATTACK): ICD-10-CM

## 2019-01-01 DIAGNOSIS — R05.9 COUGH: ICD-10-CM

## 2019-01-01 DIAGNOSIS — I50.1 LEFT VENTRICULAR FAILURE (HCC): ICD-10-CM

## 2019-01-01 DIAGNOSIS — J06.9 VIRAL URI: ICD-10-CM

## 2019-01-01 DIAGNOSIS — I44.2 ATRIOVENTRICULAR BLOCK, COMPLETE (HCC): ICD-10-CM

## 2019-01-01 DIAGNOSIS — D64.9 ANEMIA, UNSPECIFIED TYPE: ICD-10-CM

## 2019-01-01 DIAGNOSIS — J90 PLEURAL EFFUSION: ICD-10-CM

## 2019-01-01 DIAGNOSIS — I35.1 NONRHEUMATIC AORTIC VALVE INSUFFICIENCY: Primary | ICD-10-CM

## 2019-01-01 DIAGNOSIS — K21.9 GASTROESOPHAGEAL REFLUX DISEASE, ESOPHAGITIS PRESENCE NOT SPECIFIED: ICD-10-CM

## 2019-01-01 DIAGNOSIS — R05.9 COUGH: Primary | ICD-10-CM

## 2019-01-01 LAB
ANION GAP SERPL CALCULATED.3IONS-SCNC: NORMAL MMOL/L
BASOPHILS ABSOLUTE: ABNORMAL
BASOPHILS RELATIVE PERCENT: 0 %
CHLORIDE BLD-SCNC: 106 MMOL/L
CO2: 27 MMOL/L
CREAT SERPL-MCNC: 1 MG/DL
EOSINOPHILS ABSOLUTE: ABNORMAL
EOSINOPHILS RELATIVE PERCENT: 1 %
HCT VFR BLD CALC: 36.4 % (ref 36–46)
HEMOGLOBIN: 11.1 G/DL (ref 12–16)
LYMPHOCYTES ABSOLUTE: ABNORMAL
LYMPHOCYTES RELATIVE PERCENT: 12 %
MCH RBC QN AUTO: 27.4 PG
MCHC RBC AUTO-ENTMCNC: 30.5 G/DL
MCV RBC AUTO: 90.1 FL
MONOCYTES ABSOLUTE: ABNORMAL
MONOCYTES RELATIVE PERCENT: 7 %
NEUTROPHILS ABSOLUTE: ABNORMAL
NEUTROPHILS RELATIVE PERCENT: 77 %
PDW BLD-RTO: 14 %
PLATELET # BLD: 190.4 K/ΜL
PMV BLD AUTO: ABNORMAL FL
POTASSIUM SERPL-SCNC: 4.3 MMOL/L
RBC # BLD: 4.04 10^6/ΜL
SODIUM BLD-SCNC: 142 MMOL/L
WBC # BLD: 11 10^3/ML

## 2019-01-01 PROCEDURE — 4040F PNEUMOC VAC/ADMIN/RCVD: CPT | Performed by: FAMILY MEDICINE

## 2019-01-01 PROCEDURE — G8482 FLU IMMUNIZE ORDER/ADMIN: HCPCS | Performed by: FAMILY MEDICINE

## 2019-01-01 PROCEDURE — G8420 CALC BMI NORM PARAMETERS: HCPCS | Performed by: FAMILY MEDICINE

## 2019-01-01 PROCEDURE — G8420 CALC BMI NORM PARAMETERS: HCPCS | Performed by: NURSE PRACTITIONER

## 2019-01-01 PROCEDURE — 1036F TOBACCO NON-USER: CPT | Performed by: FAMILY MEDICINE

## 2019-01-01 PROCEDURE — 1123F ACP DISCUSS/DSCN MKR DOCD: CPT | Performed by: FAMILY MEDICINE

## 2019-01-01 PROCEDURE — 1090F PRES/ABSN URINE INCON ASSESS: CPT | Performed by: FAMILY MEDICINE

## 2019-01-01 PROCEDURE — 4040F PNEUMOC VAC/ADMIN/RCVD: CPT | Performed by: NURSE PRACTITIONER

## 2019-01-01 PROCEDURE — 99213 OFFICE O/P EST LOW 20 MIN: CPT | Performed by: NURSE PRACTITIONER

## 2019-01-01 PROCEDURE — 99214 OFFICE O/P EST MOD 30 MIN: CPT | Performed by: FAMILY MEDICINE

## 2019-01-01 PROCEDURE — G8482 FLU IMMUNIZE ORDER/ADMIN: HCPCS | Performed by: NURSE PRACTITIONER

## 2019-01-01 PROCEDURE — G8427 DOCREV CUR MEDS BY ELIG CLIN: HCPCS | Performed by: NURSE PRACTITIONER

## 2019-01-01 PROCEDURE — 99213 OFFICE O/P EST LOW 20 MIN: CPT | Performed by: FAMILY MEDICINE

## 2019-01-01 PROCEDURE — G8427 DOCREV CUR MEDS BY ELIG CLIN: HCPCS | Performed by: FAMILY MEDICINE

## 2019-01-01 PROCEDURE — 1123F ACP DISCUSS/DSCN MKR DOCD: CPT | Performed by: NURSE PRACTITIONER

## 2019-01-01 PROCEDURE — 1090F PRES/ABSN URINE INCON ASSESS: CPT | Performed by: NURSE PRACTITIONER

## 2019-01-01 PROCEDURE — 1036F TOBACCO NON-USER: CPT | Performed by: NURSE PRACTITIONER

## 2019-01-01 RX ORDER — TRIMETHOPRIM 100 MG/1
100 TABLET ORAL DAILY
Qty: 90 TABLET | Refills: 3 | Status: SHIPPED | OUTPATIENT
Start: 2019-01-01 | End: 2019-01-01 | Stop reason: SDUPTHER

## 2019-01-01 RX ORDER — METOPROLOL SUCCINATE 50 MG/1
25 TABLET, EXTENDED RELEASE ORAL 2 TIMES DAILY
Qty: 90 TABLET | Refills: 1 | Status: SHIPPED | OUTPATIENT
Start: 2019-01-01 | End: 2020-01-01 | Stop reason: SDUPTHER

## 2019-01-01 RX ORDER — MESALAMINE 400 MG/1
CAPSULE, DELAYED RELEASE ORAL
Qty: 540 CAPSULE | Refills: 1 | Status: SHIPPED | OUTPATIENT
Start: 2019-01-01 | End: 2020-01-01 | Stop reason: SDUPTHER

## 2019-01-01 RX ORDER — LOSARTAN POTASSIUM 50 MG/1
TABLET ORAL
Qty: 90 TABLET | Refills: 1 | Status: SHIPPED | OUTPATIENT
Start: 2019-01-01 | End: 2019-01-01 | Stop reason: SDUPTHER

## 2019-01-01 RX ORDER — LEVOFLOXACIN 500 MG/1
500 TABLET, FILM COATED ORAL DAILY
Qty: 7 TABLET | Refills: 0 | Status: SHIPPED | OUTPATIENT
Start: 2019-01-01 | End: 2019-01-01

## 2019-01-01 RX ORDER — TRIMETHOPRIM 100 MG/1
100 TABLET ORAL DAILY
Qty: 90 TABLET | Refills: 3 | Status: SHIPPED | OUTPATIENT
Start: 2019-01-01 | End: 2020-01-01 | Stop reason: ALTCHOICE

## 2019-01-01 RX ORDER — LOSARTAN POTASSIUM 50 MG/1
50 TABLET ORAL DAILY
Qty: 90 TABLET | Refills: 0 | Status: SHIPPED | OUTPATIENT
Start: 2019-01-01 | End: 2019-01-01 | Stop reason: SDUPTHER

## 2019-01-01 RX ORDER — OMEPRAZOLE 20 MG/1
20 CAPSULE, DELAYED RELEASE ORAL DAILY
Qty: 90 CAPSULE | Refills: 1 | Status: SHIPPED | OUTPATIENT
Start: 2019-01-01 | End: 2020-01-01 | Stop reason: SDUPTHER

## 2019-01-01 RX ORDER — BENZONATATE 100 MG/1
100 CAPSULE ORAL 3 TIMES DAILY PRN
Qty: 60 CAPSULE | Refills: 1 | Status: SHIPPED | OUTPATIENT
Start: 2019-01-01 | End: 2019-01-01 | Stop reason: ALTCHOICE

## 2019-01-01 RX ORDER — LOSARTAN POTASSIUM 50 MG/1
TABLET ORAL
Qty: 180 TABLET | Refills: 1 | Status: SHIPPED | OUTPATIENT
Start: 2019-01-01 | End: 2020-01-01 | Stop reason: DRUGHIGH

## 2019-01-01 RX ORDER — AMOXICILLIN 250 MG/1
250 CAPSULE ORAL 3 TIMES DAILY
Qty: 30 CAPSULE | Refills: 0 | Status: SHIPPED | OUTPATIENT
Start: 2019-01-01 | End: 2019-01-01

## 2019-01-01 ASSESSMENT — ENCOUNTER SYMPTOMS
COUGH: 1
SINUS PRESSURE: 0
SHORTNESS OF BREATH: 0
NAUSEA: 0
WHEEZING: 0
SHORTNESS OF BREATH: 0
BACK PAIN: 1
DIARRHEA: 1
ABDOMINAL PAIN: 0
ABDOMINAL PAIN: 0
RHINORRHEA: 1
SORE THROAT: 0
DIARRHEA: 1
WHEEZING: 0
ABDOMINAL PAIN: 1

## 2019-01-23 DIAGNOSIS — I10 ESSENTIAL HYPERTENSION: ICD-10-CM

## 2019-01-23 RX ORDER — OMEPRAZOLE 20 MG/1
20 CAPSULE, DELAYED RELEASE ORAL DAILY
Qty: 90 CAPSULE | Refills: 1 | Status: SHIPPED | OUTPATIENT
Start: 2019-01-23 | End: 2019-04-19 | Stop reason: SDUPTHER

## 2019-01-23 RX ORDER — LOSARTAN POTASSIUM 50 MG/1
50 TABLET ORAL DAILY
Qty: 90 TABLET | Refills: 1 | Status: SHIPPED | OUTPATIENT
Start: 2019-01-23 | End: 2019-03-21 | Stop reason: SDUPTHER

## 2019-02-27 ENCOUNTER — TELEPHONE (OUTPATIENT)
Dept: SURGERY | Age: 84
End: 2019-02-27

## 2019-03-04 ENCOUNTER — HOSPITAL ENCOUNTER (OUTPATIENT)
Dept: GENERAL RADIOLOGY | Age: 84
Discharge: HOME | End: 2019-03-06
Payer: MEDICARE

## 2019-03-04 ENCOUNTER — OFFICE VISIT (OUTPATIENT)
Dept: UROLOGY | Age: 84
End: 2019-03-04
Payer: MEDICARE

## 2019-03-04 VITALS
DIASTOLIC BLOOD PRESSURE: 62 MMHG | HEIGHT: 62 IN | SYSTOLIC BLOOD PRESSURE: 122 MMHG | BODY MASS INDEX: 24.1 KG/M2 | WEIGHT: 130.95 LBS | HEART RATE: 72 BPM

## 2019-03-04 DIAGNOSIS — N39.3 STRESS INCONTINENCE: ICD-10-CM

## 2019-03-04 DIAGNOSIS — Z87.442 HISTORY OF KIDNEY STONES: Primary | ICD-10-CM

## 2019-03-04 DIAGNOSIS — Z87.442 HISTORY OF KIDNEY STONES: ICD-10-CM

## 2019-03-04 DIAGNOSIS — N39.0 RECURRENT UTI: ICD-10-CM

## 2019-03-04 PROCEDURE — G8420 CALC BMI NORM PARAMETERS: HCPCS | Performed by: UROLOGY

## 2019-03-04 PROCEDURE — 1090F PRES/ABSN URINE INCON ASSESS: CPT | Performed by: UROLOGY

## 2019-03-04 PROCEDURE — 99214 OFFICE O/P EST MOD 30 MIN: CPT | Performed by: UROLOGY

## 2019-03-04 PROCEDURE — 4040F PNEUMOC VAC/ADMIN/RCVD: CPT | Performed by: UROLOGY

## 2019-03-04 PROCEDURE — 74018 RADEX ABDOMEN 1 VIEW: CPT

## 2019-03-04 PROCEDURE — 1036F TOBACCO NON-USER: CPT | Performed by: UROLOGY

## 2019-03-04 PROCEDURE — G8428 CUR MEDS NOT DOCUMENT: HCPCS | Performed by: UROLOGY

## 2019-03-04 PROCEDURE — G8482 FLU IMMUNIZE ORDER/ADMIN: HCPCS | Performed by: UROLOGY

## 2019-03-04 PROCEDURE — 1101F PT FALLS ASSESS-DOCD LE1/YR: CPT | Performed by: UROLOGY

## 2019-03-04 PROCEDURE — 1123F ACP DISCUSS/DSCN MKR DOCD: CPT | Performed by: UROLOGY

## 2019-03-04 RX ORDER — METOPROLOL SUCCINATE 50 MG/1
25 TABLET, EXTENDED RELEASE ORAL 2 TIMES DAILY
Qty: 90 TABLET | Refills: 0 | Status: SHIPPED | OUTPATIENT
Start: 2019-03-04 | End: 2019-03-21 | Stop reason: SDUPTHER

## 2019-03-21 RX ORDER — MESALAMINE 400 MG/1
CAPSULE, DELAYED RELEASE ORAL
Qty: 540 CAPSULE | Refills: 1 | Status: SHIPPED | OUTPATIENT
Start: 2019-03-21 | End: 2019-01-01 | Stop reason: SDUPTHER

## 2019-04-17 LAB
AGE FOR GFR: 98
ANION GAP SERPL CALCULATED.3IONS-SCNC: 11 MMOL/L
CHLORIDE BLD-SCNC: 104 MMOL/L (ref 98–120)
CO2: 31 MMOL/L (ref 22–31)
CREAT SERPL-MCNC: 1.6 MG/DL (ref 0.5–1)
EGFR BF: 36 ML/MIN/1.73 M2
EGFR BM: 49 ML/MIN/1.73 M2
EGFR WF: 30 ML/MIN/1.73 M2
EGFR WM: 40 ML/MIN/1.73 M2
POTASSIUM SERPL-SCNC: 4.8 MMOL/L (ref 3.6–5)
SODIUM BLD-SCNC: 141 MMOL/L (ref 135–145)

## 2019-04-18 DIAGNOSIS — I10 ESSENTIAL HYPERTENSION: ICD-10-CM

## 2019-04-19 RX ORDER — METOPROLOL SUCCINATE 50 MG/1
25 TABLET, EXTENDED RELEASE ORAL 2 TIMES DAILY
Qty: 30 TABLET | Refills: 1 | Status: SHIPPED | OUTPATIENT
Start: 2019-04-19 | End: 2019-04-25 | Stop reason: SDUPTHER

## 2019-04-19 RX ORDER — OMEPRAZOLE 20 MG/1
20 CAPSULE, DELAYED RELEASE ORAL DAILY
Qty: 30 CAPSULE | Refills: 1 | Status: SHIPPED | OUTPATIENT
Start: 2019-04-19 | End: 2019-04-25 | Stop reason: SDUPTHER

## 2019-04-19 RX ORDER — LOSARTAN POTASSIUM 50 MG/1
50 TABLET ORAL DAILY
Qty: 30 TABLET | Refills: 1 | Status: SHIPPED | OUTPATIENT
Start: 2019-04-19 | End: 2019-04-25 | Stop reason: SDUPTHER

## 2019-04-19 NOTE — RESULT ENCOUNTER NOTE
Noted, may review at planned f/u appt.  Please encourage to increase fluids to improve kidney function/ Cr levels   thanks

## 2019-04-24 DIAGNOSIS — I10 ESSENTIAL HYPERTENSION: ICD-10-CM

## 2019-04-24 NOTE — TELEPHONE ENCOUNTER
Soren Lucia is calling to request a refill on the following medication(s):  Requested Prescriptions     Pending Prescriptions Disp Refills    losartan (COZAAR) 50 MG tablet 30 tablet 1     Sig: Take 1 tablet by mouth daily    metoprolol succinate (TOPROL XL) 50 MG extended release tablet 30 tablet 1     Sig: Take 0.5 tablets by mouth 2 times daily    omeprazole (PRILOSEC) 20 MG delayed release capsule 30 capsule 1     Sig: Take 1 capsule by mouth Daily       Last Visit Date (If Applicable):  3/96/3972    Next Visit Date:    Visit date not found

## 2019-04-25 RX ORDER — OMEPRAZOLE 20 MG/1
20 CAPSULE, DELAYED RELEASE ORAL DAILY
Qty: 90 CAPSULE | Refills: 1 | Status: SHIPPED | OUTPATIENT
Start: 2019-04-25 | End: 2019-01-01 | Stop reason: SDUPTHER

## 2019-04-25 RX ORDER — METOPROLOL SUCCINATE 50 MG/1
25 TABLET, EXTENDED RELEASE ORAL 2 TIMES DAILY
Qty: 30 TABLET | Refills: 1 | Status: SHIPPED | OUTPATIENT
Start: 2019-04-25 | End: 2019-04-25 | Stop reason: SDUPTHER

## 2019-04-25 RX ORDER — LOSARTAN POTASSIUM 50 MG/1
50 TABLET ORAL DAILY
Qty: 30 TABLET | Refills: 1 | Status: SHIPPED | OUTPATIENT
Start: 2019-04-25 | End: 2019-04-25 | Stop reason: SDUPTHER

## 2019-04-25 RX ORDER — LOSARTAN POTASSIUM 50 MG/1
50 TABLET ORAL DAILY
Qty: 90 TABLET | Refills: 1 | Status: SHIPPED | OUTPATIENT
Start: 2019-04-25 | End: 2019-01-01 | Stop reason: SDUPTHER

## 2019-04-25 RX ORDER — OMEPRAZOLE 20 MG/1
20 CAPSULE, DELAYED RELEASE ORAL DAILY
Qty: 30 CAPSULE | Refills: 1 | Status: SHIPPED | OUTPATIENT
Start: 2019-04-25 | End: 2019-04-25 | Stop reason: SDUPTHER

## 2019-04-25 RX ORDER — METOPROLOL SUCCINATE 50 MG/1
25 TABLET, EXTENDED RELEASE ORAL 2 TIMES DAILY
Qty: 90 TABLET | Refills: 1 | Status: SHIPPED | OUTPATIENT
Start: 2019-04-25 | End: 2019-01-01 | Stop reason: SDUPTHER

## 2019-05-24 ENCOUNTER — OFFICE VISIT (OUTPATIENT)
Dept: FAMILY MEDICINE CLINIC | Age: 84
End: 2019-05-24
Payer: MEDICARE

## 2019-05-24 VITALS
HEART RATE: 72 BPM | BODY MASS INDEX: 25.07 KG/M2 | TEMPERATURE: 98.5 F | OXYGEN SATURATION: 97 % | HEIGHT: 61 IN | DIASTOLIC BLOOD PRESSURE: 84 MMHG | WEIGHT: 132.8 LBS | SYSTOLIC BLOOD PRESSURE: 130 MMHG

## 2019-05-24 DIAGNOSIS — S01.01XA SCALP LACERATION, INITIAL ENCOUNTER: Primary | ICD-10-CM

## 2019-05-24 DIAGNOSIS — S09.90XA CLOSED HEAD INJURY, INITIAL ENCOUNTER: ICD-10-CM

## 2019-05-24 PROCEDURE — 12001 RPR S/N/AX/GEN/TRNK 2.5CM/<: CPT | Performed by: NURSE PRACTITIONER

## 2019-05-24 PROCEDURE — 90714 TD VACC NO PRESV 7 YRS+ IM: CPT | Performed by: NURSE PRACTITIONER

## 2019-05-24 PROCEDURE — 90471 IMMUNIZATION ADMIN: CPT | Performed by: NURSE PRACTITIONER

## 2019-05-24 ASSESSMENT — PATIENT HEALTH QUESTIONNAIRE - PHQ9: DEPRESSION UNABLE TO ASSESS: URGENT/EMERGENT SITUATION

## 2019-05-24 NOTE — PATIENT INSTRUCTIONS
Wake every 2 hours for the first 24 hours. Have staples removed in 10 days (Kaela 3, 2019) for staple removal  (!0 AM to 7 PM). Tetanus booster today. Stop Aleve and Aspirin for 48 hours. May take tylenol as needed for pain. Watch for any changes in behavior, nausea, vomiting, changes in level of consciousness, or increasing headache. Take her to the Emergency room if this happens. Patient Education        Cuts Closed With Staples: Care Instructions  Your Care Instructions  A cut can happen anywhere on your body. The doctor used staples to close the cut. Staples easily and quickly close a cut, which helps the cut heal.  Sometimes a cut can injure tendons, blood vessels, or nerves. If the cut went deep and through the skin, the doctor may have put in a layer of stitches below the staples. The deeper layer of stitches brings the deep part of the cut together. These stitches will dissolve and don't need to be removed. The staples in the upper layer are what you see on the cut. You may have a bandage. You will need to have the staples removed, usually in 7 to 14 days. The doctor has checked you carefully, but problems can develop later. If you notice any problems or new symptoms, get medical treatment right away. Follow-up care is a key part of your treatment and safety. Be sure to make and go to all appointments, and call your doctor if you are having problems. It's also a good idea to know your test results and keep a list of the medicines you take. How can you care for yourself at home? · Keep the cut dry for the first 24 to 48 hours. After this, you can shower if your doctor okays it. Pat the cut dry. · Don't soak the cut, such as in a bathtub. Your doctor will tell you when it's safe to get the cut wet. · If your doctor told you how to care for your cut, follow your doctor's instructions. If you did not get instructions, follow this general advice:  ?  After the first 24 to 48 hours, wash around the cut with clean water 2 times a day. Don't use hydrogen peroxide or alcohol, which can slow healing. ? You may cover the cut with a thin layer of petroleum jelly, such as Vaseline, and a nonstick bandage. ? Apply more petroleum jelly and replace the bandage as needed. · Avoid any activity that could cause your cut to reopen. · Do not remove the staples on your own. Your doctor will tell you when to come back to have the staples removed. · Take pain medicines exactly as directed. ? If the doctor gave you a prescription medicine for pain, take it as prescribed. ? If you are not taking a prescription pain medicine, ask your doctor if you can take an over-the-counter medicine. When should you call for help? Call your doctor now or seek immediate medical care if:    · You have new pain, or your pain gets worse.     · The skin near the cut is cold or pale or changes color.     · You have tingling, weakness, or numbness near the cut.     · The cut starts to bleed, and blood soaks through the bandage. Oozing small amounts of blood is normal.     · You have trouble moving the area near the cut.     · You have symptoms of infection, such as:  ? Increased pain, swelling, warmth, or redness around the cut.  ? Red streaks leading from the cut.  ? Pus draining from the cut.  ? A fever.    Watch closely for changes in your health, and be sure to contact your doctor if:    · You do not get better as expected. Where can you learn more? Go to https://BeneChill.Intellinote. org and sign in to your Movea account. Enter B972 in the [facility] box to learn more about \"Cuts Closed With Staples: Care Instructions. \"     If you do not have an account, please click on the \"Sign Up Now\" link. Current as of: September 23, 2018  Content Version: 12.0  © 7053-1477 Healthwise, Incorporated. Care instructions adapted under license by [facility] ([facility]).  If you have questions about a medical condition or this instruction, always ask your healthcare professional. [address] any warranty or liability for your use of this information. Patient Education        Scalp Cut Closed With Staples or Stitches: Care Instructions  Your Care Instructions    A scalp laceration is a cut on your head. You may be able to see the cut, or it may be covered by your hair. The cut may throb or feel tender, and you may have a headache. The doctor used staples or stitches to close the cut. This helps the cut heal and reduces scarring. Your doctor will tell you when to have your stitches or staples removed. This is usually in 7 to 14 days. How long you'll be told to wait depends on where the cut is located, how big and how deep the cut is, and what your general health is like. Your scalp may itch as it heals. This is more likely if the doctor trimmed or shaved your hair in order to place the staples or stitches. The doctor has checked you carefully, but problems can develop later. If you notice any problems or new symptoms, get medical treatment right away. Follow-up care is a key part of your treatment and safety. Be sure to make and go to all appointments, and call your doctor if you are having problems. It's also a good idea to know your test results and keep a list of the medicines you take. How can you care for yourself at home? · Keep the cut dry for the first 24 to 48 hours. After this, you can shower if your doctor okays it. Pat the cut dry. · Don't soak the cut, such as in a bathtub. Your doctor will tell you when it's safe to get the cut wet. · If your doctor told you how to care for your cut, follow your doctor's instructions. If you did not get instructions, follow this general advice:  ? After the first 24 to 48 hours, wash around the cut with clean water 2 times a day. Don't use hydrogen peroxide or alcohol, which can slow healing.   ? You may cover the cut with a thin layer of petroleum jelly, such as Vaseline. ? Apply more petroleum jelly as needed. · Avoid any activity that could cause your cut to reopen. · Do not remove the staples or stitches on your own. Your doctor will tell you when to come back to have them removed. · Be safe with medicines. Read and follow all instructions on the label. ? If the doctor gave you a prescription medicine for pain, take it as prescribed. ? If you are not taking a prescription pain medicine, ask your doctor if you can take an over-the-counter medicine. When should you call for help? Call 911 anytime you think you may need emergency care. For example, call if:    · Blood is pumping from the cut or does not stop or slow down with pressure.    Call your doctor now or seek immediate medical care if:    · You have new pain or your pain gets worse.     · You have tingling, weakness, or numbness near the cut.     · The cut starts to bleed a lot. Oozing small amounts of blood is normal.     · You have symptoms of infection, such as:  ? Increased pain, swelling, warmth, or redness around the cut.  ? Red streaks leading from the cut.  ? Pus draining from the cut.  ? A fever.    Watch closely for changes in your health, and be sure to contact your doctor if:    · The cut reopens.     · You do not get better as expected. Where can you learn more? Go to https://chpepiceweb.Hurray!. org and sign in to your Superpedestrian account. Adam Lee in the [facility] box to learn more about \"Scalp Cut Closed With Staples or Stitches: Care Instructions. \"     If you do not have an account, please click on the \"Sign Up Now\" link. Current as of: September 23, 2018  Content Version: 12.0  © 7923-0939 Healthwise, [employer]. Care instructions adapted under license by [facility] ([facility]).  If you have questions about a medical condition or this instruction, always ask your healthcare professional. Ellen Tay any warranty or liability for your use of this information. Patient Education        Learning About a Closed Head Injury  What is a closed head injury? A closed head injury happens when your head gets hit hard. The strong force of the blow causes your brain to shake in your skull. This movement can cause the brain to bruise, swell, or tear. Sometimes nerves or blood vessels also get damaged. This can cause bleeding in or around the brain. A concussion is a type of closed head injury. What are the symptoms? If you have a mild concussion, you may have a mild headache or feel \"not quite right. \" These symptoms are common. They usually go away over a few days to 4 weeks. But sometimes after a concussion, you feel like you can't function as well as before the injury. And you have new symptoms. This is called postconcussive syndrome. You may:  · Find it harder to solve problems, think, concentrate, or remember. · Have headaches. · Have changes in your sleep patterns, such as not being able to sleep or sleeping all the time. · Have changes in your personality. · Not be interested in your usual activities. · Feel angry or anxious without a clear reason. · Lose your sense of taste or smell. · Be dizzy, lightheaded, or unsteady. It may be hard to stand or walk. How is a closed head injury treated? Any person who may have a concussion needs to see a doctor. Some people have to stay in the hospital to be watched. Others can go home safely. If you go home, follow your doctor's instructions. He or she will tell you if you need someone to watch you closely for the next 24 hours or longer. Rest is the best treatment. Get plenty of sleep at night. And try to rest during the day. · Avoid activities that are physically or mentally demanding. These include housework, exercise, and schoolwork. And don't play video games, send text messages, or use the computer.  You may need to change your school or work schedule to be able to avoid these

## 2019-05-24 NOTE — PROGRESS NOTES
SUBJECTIVE:   80 y.o. female sustained laceration of scalp 1 hours ago. Nature of injury: Konrad Dirk backwards, hitting her head on a plastic box which tore open her scalp. Tetanus vaccination status reviewed: Td vaccination indicated and given today. No loss of consciousness, no confusion. Small amount of blood loss. No speech difficulties. Denies headache, visual changes, nausea and vomiting. Denies dizziness. OBJECTIVE:   Patient appears well, vitals are normal. Laceration 2 cm noted. Description: clean wound edges, no foreign bodies. Neurovascular and tendon structures are intact. [place] [address]. PERRLA. Cranial nerves intact. Speech normal.  No confusion. No pronator drift. Hand grasp equal and strong. Pedal push and pull equal and strong. Finger to nose normal bilaterally. ASSESSMENT:   Laceration as described. PLAN:   Wound cleansed with saline and CHG. No visible foreign material, nor necrotic issue noted. Three staples placed. Wound care instructions provided. Observe for any signs of infection or other problems. Return for staple removal in 10 days. Have staples removed in 10 days (Kaela 3, 2019) for staple removal  (!0 AM to 7 PM). Tetanus booster today. Watch for any changes in behavior, nausea, vomiting, changes in level of consciousness, or increasing headache. Take her to the Emergency room if this happens. Wake every 2 hours for the first 24 hours. Stop Aleve and Aspirin for 48 hours. May wash hair after 24 hours.

## 2019-06-03 ENCOUNTER — OFFICE VISIT (OUTPATIENT)
Dept: FAMILY MEDICINE CLINIC | Age: 84
End: 2019-06-03

## 2019-06-03 VITALS
WEIGHT: 132 LBS | BODY MASS INDEX: 24.92 KG/M2 | SYSTOLIC BLOOD PRESSURE: 120 MMHG | TEMPERATURE: 97.6 F | HEIGHT: 61 IN | DIASTOLIC BLOOD PRESSURE: 72 MMHG | HEART RATE: 72 BPM | OXYGEN SATURATION: 95 %

## 2019-06-03 DIAGNOSIS — Z48.02 ENCOUNTER FOR STAPLE REMOVAL: Primary | ICD-10-CM

## 2019-06-03 PROBLEM — H35.30 MACULAR DEGENERATION DISEASE: Status: ACTIVE | Noted: 2019-06-03

## 2019-06-03 PROBLEM — I42.9 CARDIOMYOPATHY (HCC): Status: ACTIVE | Noted: 2018-12-06

## 2019-06-03 PROBLEM — K57.92 DIVERTICULITIS: Status: ACTIVE | Noted: 2019-06-03

## 2019-06-03 PROBLEM — I11.9 HYPERTENSIVE HEART DISEASE WITHOUT HEART FAILURE: Status: ACTIVE | Noted: 2018-12-06

## 2019-06-03 PROBLEM — I35.1 NONRHEUMATIC AORTIC VALVE INSUFFICIENCY: Status: ACTIVE | Noted: 2018-12-06

## 2019-06-03 PROCEDURE — 99999 PR OFFICE/OUTPT VISIT,PROCEDURE ONLY: CPT | Performed by: NURSE PRACTITIONER

## 2019-06-03 NOTE — PROGRESS NOTES
Head: Normocephalic. Head is with laceration. Pulmonary/Chest: Effort normal.   Neurological: She is alert and oriented to person, place, and time. Skin: Skin is warm and dry. Capillary refill takes less than 2 seconds. Laceration noted. She is not diaphoretic. Assessment:      Diagnosis Orders   1. Encounter for staple removal         3 staples were removed without difficulty. Patient tolerated procedure well. No results found for this visit on 06/03/19. Quality & Risk Score Accuracy    Last edited 06/03/19 10:54 EDT by LENA Moore CNP             Plan:       Follow up with primary care provider in 1 to 2 days. Wash area gently until scab is off. There are no Patient Instructions on file for this visit. Patient/Caregiver instructed on use, benefit, and side effects of prescribed medications. All patient/parent/caregiver questions answered. Patient/parent/caregiver voiced understanding. Reviewed health maintenance. Instructed to continue current medications, diet and exercise. Patient agreed with treatment plan. Follow up as directed.            Electronically signed by LENA Moore CNP on6/3/2019

## 2019-06-03 NOTE — PATIENT INSTRUCTIONS
Follow up with primary care provider in 1 to 2 days. Wash area gently until scab is off. Patient Education        Learning About Stitches and Staples Removal  When are stitches and staples removed? Your doctor will tell you when to have your stitches or staples removed, usually in 7 to 14 days. How long you'll be told to wait will depend on things like where the wound is located, how big and how deep the wound is, and what your general health is like. Do not remove the stitches on your own. Stitches on the face are usually removed within a week. But stitches and staples on other areas of the body, such as on the back or belly or over a joint, may need to stay in place longer, often a week or two. Be sure to follow your doctor's instructions. How are stitches and staples removed? It usually doesn't hurt when the doctor removes the stitches or staples. You may feel a tug as each stitch or staple is removed. · You will either be seated or lying down. · To remove stitches, the doctor will use scissors to cut each of the knots and then pull the threads out. · To remove staples, the doctor will use a tool to take out the staples one at a time. · The area may still feel tender after the stitches or staples are gone. But it should feel better within a few minutes or up to a few hours. What can you expect after stitches and staples are removed? Depending on the type and location of the cut, you will have a scar. Scars usually fade over time. Keep the area clean, but you won't need a bandage. When should you call for help? Call your doctor now or seek immediate medical care if :  · You have new pain, or your pain gets worse. · You have trouble moving the area near the scar. · You have symptoms of infection, such as:  ? Increased pain, swelling, warmth, or redness around the scar. ? Red streaks leading from the scar. ? Pus draining from the scar. ? A fever.   Watch closely for changes in your health, and be

## 2019-07-08 ENCOUNTER — OFFICE VISIT (OUTPATIENT)
Dept: FAMILY MEDICINE CLINIC | Age: 84
End: 2019-07-08
Payer: MEDICARE

## 2019-07-08 VITALS
HEART RATE: 74 BPM | OXYGEN SATURATION: 94 % | SYSTOLIC BLOOD PRESSURE: 116 MMHG | WEIGHT: 132 LBS | BODY MASS INDEX: 24.92 KG/M2 | DIASTOLIC BLOOD PRESSURE: 80 MMHG | HEIGHT: 61 IN

## 2019-07-08 DIAGNOSIS — Z91.81 AT HIGH RISK FOR FALLS: ICD-10-CM

## 2019-07-08 DIAGNOSIS — G44.329 CHRONIC POST-TRAUMATIC HEADACHE, NOT INTRACTABLE: Primary | ICD-10-CM

## 2019-07-08 PROCEDURE — 1090F PRES/ABSN URINE INCON ASSESS: CPT | Performed by: FAMILY MEDICINE

## 2019-07-08 PROCEDURE — G8419 CALC BMI OUT NRM PARAM NOF/U: HCPCS | Performed by: FAMILY MEDICINE

## 2019-07-08 PROCEDURE — G8427 DOCREV CUR MEDS BY ELIG CLIN: HCPCS | Performed by: FAMILY MEDICINE

## 2019-07-08 PROCEDURE — 99213 OFFICE O/P EST LOW 20 MIN: CPT | Performed by: FAMILY MEDICINE

## 2019-07-08 PROCEDURE — 1123F ACP DISCUSS/DSCN MKR DOCD: CPT | Performed by: FAMILY MEDICINE

## 2019-07-08 PROCEDURE — 4040F PNEUMOC VAC/ADMIN/RCVD: CPT | Performed by: FAMILY MEDICINE

## 2019-07-08 PROCEDURE — 1036F TOBACCO NON-USER: CPT | Performed by: FAMILY MEDICINE

## 2019-07-08 ASSESSMENT — PATIENT HEALTH QUESTIONNAIRE - PHQ9
1. LITTLE INTEREST OR PLEASURE IN DOING THINGS: 0
2. FEELING DOWN, DEPRESSED OR HOPELESS: 0
SUM OF ALL RESPONSES TO PHQ QUESTIONS 1-9: 0
SUM OF ALL RESPONSES TO PHQ QUESTIONS 1-9: 0
SUM OF ALL RESPONSES TO PHQ9 QUESTIONS 1 & 2: 0

## 2019-07-08 ASSESSMENT — ENCOUNTER SYMPTOMS
BACK PAIN: 1
SHORTNESS OF BREATH: 0

## 2019-07-08 NOTE — PROGRESS NOTES
[address] [address]  Dept: [phone]  Dept Fax: [phone]    Umu Wu is a 80 y.o. female who presents today for her medical conditions/complaints as noted below. Umu Wu c/o of Headache; Memory Loss; and Other (5/24/19 pt fell, got stiches in head. )      HPI:     HPI   Patient arrives for evaluation along with 2 daughters. He was noted to have a fall on 5/24/2019 get stitches in her head. Patient and family have been noticing issues with headaches and memory loss since that event. She has been having fatigue and taking a lot of naps during the day. No further falls after that, had just \"missed the chair\". Noting HA on vertex several times daily per pt. Patient also has noted evidence of TIAs per the family daughters reporting that she has times where her speech is slurred she complains about numbness in her hand left side and fingers. Every 3 months or so for past several years. Some difficulty with a aphasia. New hearing aids were noted in June. Pacemaker check in June also.     BP Readings from Last 3 Encounters:   07/08/19 116/80   06/03/19 120/72   05/24/19 130/84          (goal 120/80)    Past Medical History:   Diagnosis Date    Cancer ([address]     bilat mastectomy    Congenital heart disease     Hyperlipidemia     Hypertension       Past Surgical History:   Procedure Laterality Date    CHOLECYSTECTOMY      FRACTURE SURGERY      sacrum    JOINT REPLACEMENT      MASTECTOMY, BILATERAL      [place] CYSTOSCOPY,INSERT URETERAL STENT Right 4/24/2018    Cysto RIGHT  ureteroscopy RIGHT STENTPLACEMENT  stone BASKETING performed by Reyes Daunt, MD at [place] [address] History   Problem Relation Age of Onset    Cancer Mother     High Blood Pressure Mother     Cancer Father     Cancer Paternal Grandmother        Social History     Tobacco Use    Smoking status: Never Smoker    Smokeless tobacco: Never Used Substance Use Topics    Alcohol use: No      Prior to Admission medications    Medication Sig Start Date End Date Taking? Authorizing Provider   losartan (COZAAR) 50 MG tablet Take 1 tablet by mouth daily 4/25/19  Yes Miguel Marroquin MD   metoprolol succinate (TOPROL XL) 50 MG extended release tablet Take 0.5 tablets by mouth 2 times daily 4/25/19  Yes Miguel Marroquin MD   omeprazole (PRILOSEC) 20 MG delayed release capsule Take 1 capsule by mouth Daily 4/25/19  Yes Miguel Marroquin MD   DELZICOL 400 MG CPDR delayed release capsule 2 caps TID 3/21/19  Yes Miguel Marroquin MD   trimethoprim (TRIMPEX) 100 MG tablet Take 1 tablet by mouth daily 9/10/18  Yes Reyes Daunt, MD   diphenhydrAMINE-APAP, sleep, (TYLENOL PM EXTRA STRENGTH)  MG tablet Take 1 tablet by mouth nightly as needed for Sleep   Yes Historical Provider, MD   Multiple Vitamins-Minerals (PRESERVISION AREDS 2 PO) Take by mouth 2 times daily   Yes Historical Provider, MD   Naproxen Sodium (ALEVE) 220 MG CAPS Take 220 mg by mouth 2 times daily   Yes Historical Provider, MD   Polyethylene Glycol 3350 (MIRALAX PO) Take by mouth   Yes Historical Provider, MD   aspirin 81 MG tablet Take 81 mg by mouth daily   Yes Historical Provider, MD   Prosthesis MISC 1 Units by Does not apply route Indications: Bilateral Breast Prostetics with bra 5/1/17  Yes Miguel Marroquin MD     Allergies   Allergen Reactions    Lotrel [Amlodipine Besy-Benazepril Hcl] Other (See Comments)     Cough       Health Maintenance   Topic Date Due    Annual Wellness Visit (AWV)  03/27/1984    Flu vaccine (1) 09/01/2019    Potassium monitoring  04/17/2020    Creatinine monitoring  04/17/2020    DTaP/Tdap/Td vaccine (3 - Td) 05/24/2029    Shingles Vaccine  Completed    Pneumococcal 65+ years Vaccine  Completed       Subjective:      Review of Systems   Constitutional: Positive for fatigue (takes a lot of naps during the day). Occasionally has TIAs, don't last long and not frequent.

## 2019-11-18 PROBLEM — R15.9 INCONTINENCE OF FECES: Status: ACTIVE | Noted: 2019-01-01

## 2019-11-27 PROBLEM — J90 PLEURAL EFFUSION: Status: ACTIVE | Noted: 2019-01-01

## 2019-11-27 PROBLEM — D64.9 ANEMIA: Status: ACTIVE | Noted: 2019-01-01

## 2019-12-12 PROBLEM — G45.9 TIA (TRANSIENT ISCHEMIC ATTACK): Status: ACTIVE | Noted: 2019-01-01

## 2020-01-01 ENCOUNTER — OFFICE VISIT (OUTPATIENT)
Dept: FAMILY MEDICINE CLINIC | Age: 85
End: 2020-01-01
Payer: MEDICARE

## 2020-01-01 ENCOUNTER — OFFICE VISIT (OUTPATIENT)
Dept: UROLOGY | Age: 85
End: 2020-01-01
Payer: MEDICARE

## 2020-01-01 ENCOUNTER — HOSPITAL ENCOUNTER (OUTPATIENT)
Dept: GENERAL RADIOLOGY | Age: 85
Discharge: HOME | End: 2020-01-15
Payer: MEDICARE

## 2020-01-01 ENCOUNTER — PATIENT MESSAGE (OUTPATIENT)
Dept: FAMILY MEDICINE CLINIC | Age: 85
End: 2020-01-01

## 2020-01-01 ENCOUNTER — TELEPHONE (OUTPATIENT)
Dept: FAMILY MEDICINE CLINIC | Age: 85
End: 2020-01-01

## 2020-01-01 ENCOUNTER — OFFICE VISIT (OUTPATIENT)
Dept: PODIATRY | Age: 85
End: 2020-01-01
Payer: MEDICARE

## 2020-01-01 ENCOUNTER — TELEPHONE (OUTPATIENT)
Dept: UROLOGY | Age: 85
End: 2020-01-01

## 2020-01-01 ENCOUNTER — HOSPITAL ENCOUNTER (OUTPATIENT)
Age: 85
Setting detail: SPECIMEN
Discharge: HOME | End: 2020-01-02
Payer: MEDICARE

## 2020-01-01 ENCOUNTER — HOSPITAL ENCOUNTER (OUTPATIENT)
Dept: GENERAL RADIOLOGY | Age: 85
Discharge: HOME | End: 2020-07-08
Payer: MEDICARE

## 2020-01-01 VITALS
RESPIRATION RATE: 16 BRPM | HEART RATE: 90 BPM | SYSTOLIC BLOOD PRESSURE: 112 MMHG | OXYGEN SATURATION: 94 % | BODY MASS INDEX: 25.58 KG/M2 | DIASTOLIC BLOOD PRESSURE: 70 MMHG | WEIGHT: 131 LBS

## 2020-01-01 VITALS
WEIGHT: 133 LBS | BODY MASS INDEX: 26.11 KG/M2 | HEART RATE: 71 BPM | HEIGHT: 60 IN | SYSTOLIC BLOOD PRESSURE: 100 MMHG | DIASTOLIC BLOOD PRESSURE: 68 MMHG | OXYGEN SATURATION: 91 %

## 2020-01-01 VITALS
HEART RATE: 72 BPM | BODY MASS INDEX: 25.91 KG/M2 | DIASTOLIC BLOOD PRESSURE: 64 MMHG | HEIGHT: 60 IN | WEIGHT: 132 LBS | SYSTOLIC BLOOD PRESSURE: 98 MMHG

## 2020-01-01 VITALS
SYSTOLIC BLOOD PRESSURE: 90 MMHG | DIASTOLIC BLOOD PRESSURE: 64 MMHG | OXYGEN SATURATION: 94 % | HEART RATE: 78 BPM | BODY MASS INDEX: 24.55 KG/M2 | HEIGHT: 61 IN | WEIGHT: 130 LBS

## 2020-01-01 VITALS
SYSTOLIC BLOOD PRESSURE: 108 MMHG | WEIGHT: 129 LBS | DIASTOLIC BLOOD PRESSURE: 64 MMHG | HEART RATE: 79 BPM | BODY MASS INDEX: 24.35 KG/M2 | HEIGHT: 61 IN

## 2020-01-01 VITALS
WEIGHT: 126 LBS | HEIGHT: 61 IN | BODY MASS INDEX: 23.79 KG/M2 | DIASTOLIC BLOOD PRESSURE: 64 MMHG | HEART RATE: 74 BPM | SYSTOLIC BLOOD PRESSURE: 90 MMHG

## 2020-01-01 VITALS — DIASTOLIC BLOOD PRESSURE: 62 MMHG | HEART RATE: 60 BPM | SYSTOLIC BLOOD PRESSURE: 114 MMHG

## 2020-01-01 LAB
CULTURE: ABNORMAL
Lab: ABNORMAL
SPECIMEN DESCRIPTION: ABNORMAL

## 2020-01-01 PROCEDURE — 1036F TOBACCO NON-USER: CPT | Performed by: FAMILY MEDICINE

## 2020-01-01 PROCEDURE — 99211 OFF/OP EST MAY X REQ PHY/QHP: CPT | Performed by: FAMILY MEDICINE

## 2020-01-01 PROCEDURE — G8427 DOCREV CUR MEDS BY ELIG CLIN: HCPCS | Performed by: FAMILY MEDICINE

## 2020-01-01 PROCEDURE — G8417 CALC BMI ABV UP PARAM F/U: HCPCS | Performed by: UROLOGY

## 2020-01-01 PROCEDURE — 4040F PNEUMOC VAC/ADMIN/RCVD: CPT | Performed by: FAMILY MEDICINE

## 2020-01-01 PROCEDURE — 1090F PRES/ABSN URINE INCON ASSESS: CPT | Performed by: UROLOGY

## 2020-01-01 PROCEDURE — 87086 URINE CULTURE/COLONY COUNT: CPT

## 2020-01-01 PROCEDURE — 99212 OFFICE O/P EST SF 10 MIN: CPT | Performed by: FAMILY MEDICINE

## 2020-01-01 PROCEDURE — 1123F ACP DISCUSS/DSCN MKR DOCD: CPT | Performed by: FAMILY MEDICINE

## 2020-01-01 PROCEDURE — G8427 DOCREV CUR MEDS BY ELIG CLIN: HCPCS | Performed by: PODIATRIST

## 2020-01-01 PROCEDURE — 87077 CULTURE AEROBIC IDENTIFY: CPT

## 2020-01-01 PROCEDURE — G8427 DOCREV CUR MEDS BY ELIG CLIN: HCPCS | Performed by: UROLOGY

## 2020-01-01 PROCEDURE — 1123F ACP DISCUSS/DSCN MKR DOCD: CPT | Performed by: PODIATRIST

## 2020-01-01 PROCEDURE — G8420 CALC BMI NORM PARAMETERS: HCPCS | Performed by: FAMILY MEDICINE

## 2020-01-01 PROCEDURE — 1123F ACP DISCUSS/DSCN MKR DOCD: CPT | Performed by: UROLOGY

## 2020-01-01 PROCEDURE — 99214 OFFICE O/P EST MOD 30 MIN: CPT | Performed by: FAMILY MEDICINE

## 2020-01-01 PROCEDURE — 1090F PRES/ABSN URINE INCON ASSESS: CPT | Performed by: PODIATRIST

## 2020-01-01 PROCEDURE — G8417 CALC BMI ABV UP PARAM F/U: HCPCS | Performed by: FAMILY MEDICINE

## 2020-01-01 PROCEDURE — 99203 OFFICE O/P NEW LOW 30 MIN: CPT | Performed by: PODIATRIST

## 2020-01-01 PROCEDURE — 1090F PRES/ABSN URINE INCON ASSESS: CPT | Performed by: FAMILY MEDICINE

## 2020-01-01 PROCEDURE — G8417 CALC BMI ABV UP PARAM F/U: HCPCS | Performed by: PODIATRIST

## 2020-01-01 PROCEDURE — 4040F PNEUMOC VAC/ADMIN/RCVD: CPT | Performed by: PODIATRIST

## 2020-01-01 PROCEDURE — 87186 SC STD MICRODIL/AGAR DIL: CPT

## 2020-01-01 PROCEDURE — G0439 PPPS, SUBSEQ VISIT: HCPCS | Performed by: FAMILY MEDICINE

## 2020-01-01 PROCEDURE — 99214 OFFICE O/P EST MOD 30 MIN: CPT | Performed by: UROLOGY

## 2020-01-01 PROCEDURE — 74018 RADEX ABDOMEN 1 VIEW: CPT

## 2020-01-01 PROCEDURE — G8482 FLU IMMUNIZE ORDER/ADMIN: HCPCS | Performed by: PODIATRIST

## 2020-01-01 PROCEDURE — 4040F PNEUMOC VAC/ADMIN/RCVD: CPT | Performed by: UROLOGY

## 2020-01-01 PROCEDURE — 99212 OFFICE O/P EST SF 10 MIN: CPT

## 2020-01-01 PROCEDURE — 99211 OFF/OP EST MAY X REQ PHY/QHP: CPT

## 2020-01-01 PROCEDURE — 51798 US URINE CAPACITY MEASURE: CPT | Performed by: UROLOGY

## 2020-01-01 PROCEDURE — 1036F TOBACCO NON-USER: CPT | Performed by: UROLOGY

## 2020-01-01 PROCEDURE — G8482 FLU IMMUNIZE ORDER/ADMIN: HCPCS | Performed by: FAMILY MEDICINE

## 2020-01-01 PROCEDURE — G8420 CALC BMI NORM PARAMETERS: HCPCS | Performed by: UROLOGY

## 2020-01-01 PROCEDURE — 1036F TOBACCO NON-USER: CPT | Performed by: PODIATRIST

## 2020-01-01 PROCEDURE — 99213 OFFICE O/P EST LOW 20 MIN: CPT | Performed by: FAMILY MEDICINE

## 2020-01-01 PROCEDURE — G8482 FLU IMMUNIZE ORDER/ADMIN: HCPCS | Performed by: UROLOGY

## 2020-01-01 PROCEDURE — 97597 DBRDMT OPN WND 1ST 20 CM/<: CPT | Performed by: PODIATRIST

## 2020-01-01 RX ORDER — NITROFURANTOIN MACROCRYSTALS 25 MG/1
25 CAPSULE ORAL 4 TIMES DAILY
Qty: 56 CAPSULE | Refills: 0 | Status: SHIPPED | OUTPATIENT
Start: 2020-01-01 | End: 2020-01-01

## 2020-01-01 RX ORDER — LOSARTAN POTASSIUM 50 MG/1
TABLET ORAL
Qty: 180 TABLET | Refills: 1 | Status: SHIPPED
Start: 2020-01-01 | End: 2020-01-01

## 2020-01-01 RX ORDER — CEPHALEXIN 500 MG/1
500 CAPSULE ORAL 2 TIMES DAILY
Qty: 20 CAPSULE | Refills: 0 | Status: SHIPPED | OUTPATIENT
Start: 2020-01-01 | End: 2020-01-01 | Stop reason: SINTOL

## 2020-01-01 RX ORDER — RIVASTIGMINE TARTRATE 1.5 MG/1
1.5 CAPSULE ORAL 2 TIMES DAILY
Qty: 60 CAPSULE | Refills: 3 | Status: SHIPPED | OUTPATIENT
Start: 2020-01-01 | End: 2020-01-01 | Stop reason: SDUPTHER

## 2020-01-01 RX ORDER — METOPROLOL SUCCINATE 50 MG/1
25 TABLET, EXTENDED RELEASE ORAL 2 TIMES DAILY
Qty: 90 TABLET | Refills: 1 | Status: SHIPPED | OUTPATIENT
Start: 2020-01-01

## 2020-01-01 RX ORDER — HYDROCHLOROTHIAZIDE 12.5 MG/1
12.5 CAPSULE, GELATIN COATED ORAL EVERY MORNING
Qty: 30 CAPSULE | Refills: 3 | Status: SHIPPED | OUTPATIENT
Start: 2020-01-01

## 2020-01-01 RX ORDER — BENZONATATE 100 MG/1
100 CAPSULE ORAL 3 TIMES DAILY PRN
COMMUNITY

## 2020-01-01 RX ORDER — METOPROLOL SUCCINATE 50 MG/1
25 TABLET, EXTENDED RELEASE ORAL 2 TIMES DAILY
Qty: 90 TABLET | Refills: 1 | Status: CANCELLED | OUTPATIENT
Start: 2020-01-01

## 2020-01-01 ASSESSMENT — ENCOUNTER SYMPTOMS
COUGH: 0
VOMITING: 0
SHORTNESS OF BREATH: 0
ABDOMINAL PAIN: 0
ABDOMINAL PAIN: 0
NAUSEA: 0
SHORTNESS OF BREATH: 1
ABDOMINAL PAIN: 0
SINUS PAIN: 0
VOMITING: 0
NAUSEA: 0

## 2020-01-01 ASSESSMENT — PATIENT HEALTH QUESTIONNAIRE - PHQ9
SUM OF ALL RESPONSES TO PHQ9 QUESTIONS 1 & 2: 0
1. LITTLE INTEREST OR PLEASURE IN DOING THINGS: 0
SUM OF ALL RESPONSES TO PHQ QUESTIONS 1-9: 0
2. FEELING DOWN, DEPRESSED OR HOPELESS: 0
SUM OF ALL RESPONSES TO PHQ QUESTIONS 1-9: 0

## 2020-01-02 PROBLEM — Z00.00 ROUTINE GENERAL MEDICAL EXAMINATION AT A HEALTH CARE FACILITY: Status: ACTIVE | Noted: 2020-01-01

## 2020-01-02 NOTE — PROGRESS NOTES
[address] [place] [address]  Dept: [phone]  Dept Fax: [phone]    Renzo Peña is a 80 y.o. female who presents today for her medical conditions/complaints as noted below. Renzo Peña is c/o of Medicare AWV and Discuss Labs (urine cult)      HPI:     HPI  Pt here for annual medicare wellness evaluation and discussion or recent abnormal urine culture    Urology follow up planned in March per pt/family    Pt reports we ask lots of questions. No prior treatment of memory loss.   Hx of recurrent TIA's last episode all day on Thanksgiving      BP Readings from Last 3 Encounters:   01/02/20 90/64   12/12/19 110/70   11/27/19 104/60          (goal 120/80)    Past Medical History:   Diagnosis Date    Cancer ([address]     bilat mastectomy    Congenital heart disease     Hyperlipidemia     Hypertension       Past Surgical History:   Procedure Laterality Date    CHOLECYSTECTOMY      FRACTURE SURGERY      sacrum    JOINT REPLACEMENT      MASTECTOMY, BILATERAL      [place] CYSTOSCOPY,INSERT URETERAL STENT Right 4/24/2018    Cysto RIGHT  ureteroscopy RIGHT STENTPLACEMENT  stone BASKETING performed by Nellie Bean MD at [place] [address] History   Problem Relation Age of Onset    Cancer Mother     High Blood Pressure Mother     Cancer Father     Cancer Paternal Grandmother        Social History     Tobacco Use    Smoking status: Never Smoker    Smokeless tobacco: Never Used   Substance Use Topics    Alcohol use: No      Current Outpatient Medications   Medication Sig Dispense Refill    nitrofurantoin (MACRODANTIN) 25 MG capsule Take 1 capsule by mouth 4 times daily for 14 days 56 capsule 0    losartan (COZAAR) 50 MG tablet TAKE 1 TABLET TWICE DAILY 180 tablet 1    metoprolol succinate (TOPROL XL) 50 MG extended release tablet Take 0.5 tablets by mouth 2 times daily 90 tablet 1    omeprazole (PRILOSEC) 20 MG delayed release capsule Take 1 capsule by mouth Daily 90 capsule 1    DELZICOL 400 MG CPDR delayed release capsule TAKE 2 CAPSULES THREE TIMES A  capsule 1    diphenhydrAMINE-APAP, sleep, (TYLENOL PM EXTRA STRENGTH)  MG tablet Take 1 tablet by mouth nightly as needed for Sleep      Multiple Vitamins-Minerals (PRESERVISION AREDS 2 PO) Take by mouth 2 times daily      Naproxen Sodium (ALEVE) 220 MG CAPS Take 220 mg by mouth 2 times daily      aspirin 81 MG tablet Take 81 mg by mouth daily      Prosthesis MISC 1 Units by Does not apply route Indications: Bilateral Breast Prostetics with bra 1 each 1     No current facility-administered medications for this visit. Allergies   Allergen Reactions    Lotrel [Amlodipine Besy-Benazepril Hcl] Other (See Comments)     Cough       Health Maintenance   Topic Date Due    Annual Wellness Visit (AWV)  06/19/2019    Potassium monitoring  11/19/2020    Creatinine monitoring  11/19/2020    DTaP/Tdap/Td vaccine (3 - Td) 05/24/2029    Flu vaccine  Completed    Shingles Vaccine  Completed    Pneumococcal 65+ years Vaccine  Completed       Subjective:      Review of Systems   Constitutional: Negative for activity change, appetite change and fatigue. HENT: Negative for congestion and sinus pain. Eyes: Negative for visual disturbance. Respiratory: Negative for cough and shortness of breath. Cardiovascular: Negative for chest pain, palpitations and leg swelling. Gastrointestinal: Negative for abdominal pain, nausea and vomiting. Genitourinary: Negative for dysuria, frequency and vaginal discharge. 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 Neurological: Negative for dizziness. Psychiatric/Behavioral: Negative for confusion and sleep disturbance. The patient is not nervous/anxious. MINI-MENTAL STATUS EXAM (Stephen Chahal)    What is the Year? Season? Date?       Day? Month?   (indicate # correct)        2    Where are we: State? County? Town? Place? Floor? (indicate # correct)        5    Name 3 objects and have pt repeat.                (indicate # correct)        3    Serial 7's or spell \"world\" backwards.                 (indicate # correct)        0    Recall 3 objects                (indicate # correct)        2    Patient names a pencil & a watch                (indicate # correct)        2    Read and obey \"Close your eyes\"                (indicate 1 if correct)     1    Copy intersecting pentagons                (indicate 1 if correct)     0    Write a sentence                (indicate 1 if correct)     0    Repeat \"no ifs, ands, or buts\"                (indicate 1 if correct)     0    Follow 3-stage command                (indicate # done correctly) 0                                            ------------  TOTAL SCORE   (max = 30)                  14      REFERENCE INFORMATION FOR THE CLINICIAN:    \"Low\" score    = 0-23  \"Normal\" score = 24-30    Objective:     BP 90/64 (Site: Left Upper Arm, Position: Sitting, Cuff Size: Small Adult)   Pulse 74   Ht 5' 0.51\" (1.537 m)   Wt 126 lb (57.2 kg)   BMI 24.19 kg/m²   Physical Exam  Weight graph noted- stabilized    Stable CHF by history and exam.    Assessment:      1. Recurrent UTI                     Plan:     Patient Instructions   Move up urology reevaluation sooner as able, stop Trimethoprim until discussing with urology    Orders Placed This Encounter   Procedures    Urine Culture     Standing Status:   Future     Standing Expiration Date:   3/2/2020     Order Specific Question:   Specify (ex-cath, midstream, cysto, etc)? Answer:   midstream    Urine Culture     Standing Status:   Future     Standing Expiration Date:   3/2/2020     Order Specific Question:   Specify (ex-cath, midstream, cysto, etc)?      Answer:   midstream     Orders Placed This Encounter   Medications    nitrofurantoin reviewed and are found in [facility]. The following problems were reviewed today and where indicated follow up appointments were made and/or referrals ordered. Positive Risk Factor Screenings with Interventions:     Fall Risk:  2 or more falls in past year?: (!) yes  Fall with injury in past year?: (!) yes  Fall Risk Interventions:    · Home safety tips provided    General Health:  General  In general, how would you say your health is?: Fair  In the past 7 days, have you experienced any of the following? New or Increased Pain, New or Increased Fatigue, Loneliness, Social Isolation, Stress or Anger?: (!) New or Increased Fatigue, Loneliness  Do you get the social and emotional support that you need?: Yes  Do you have a Living Will?: Yes  General Health Risk Interventions:  · Inadequate social/emotional support: family is around for support, other peers passing    Health Habits/Nutrition:  Health Habits/Nutrition  Do you exercise for at least 20 minutes 2-3 times per week?: Yes  Have you lost any weight without trying in the past 3 months?: (!) Yes  Do you eat fewer than 2 meals per day?: No  Have you seen a dentist within the past year?: Yes  Body mass index is 24.19 kg/m². Health Habits/Nutrition Interventions:  · Nutritional issues:  educational materials for healthy, well-balanced diet provided    Hearing/Vision:  No exam data present  Hearing/Vision  Do you or your family notice any trouble with your hearing?: (!) Yes  Do you have difficulty driving, watching TV, or doing any of your daily activities because of your eyesight?: (!) Yes  Have you had an eye exam within the past year?: Yes  Hearing/Vision Interventions:  · Hearing concerns:  patient declines any further evaluation/treatment for hearing issues    ADL:  ADLs  In the past 7 days, did you need help from others to perform any of the following everyday activities?  Eating, dressing, grooming, bathing, toileting, or walking/balance?: (!) Walking/Balance  In the past 7 days, did you need help from others to take care of any of the following? Laundry, housekeeping, banking/finances, shopping, telephone use, food preparation, transportation, or taking medications?: (!) Laundry, Housekeeping, Banking/Finances, Shopping, Food Preparation, Transportation, Taking Medications  ADL Interventions:  · Patient declines any further evaluation/treatment for this issue    Personalized Preventive Plan   Current Health Maintenance Status  Immunization History   Administered Date(s) Administered    Influenza, High Dose (Fluzone 65 yrs and older) 09/30/2016, 08/22/2017, 09/20/2018, 10/16/2019    Pneumococcal Conjugate 13-valent ([id number]) 10/26/2015    Pneumococcal Polysaccharide ([id number]) 06/30/2010, 07/21/2018    Td (Adult), 2 Lf Tetanus Toxoid, Pf (Td, Absorbed) 05/24/2019    Tdap (Boostrix, Adacel) 10/16/2009    Zoster Live (Zostavax) 03/06/2014    Zoster Recombinant (Shingrix) 07/21/2018, 11/28/2018        Health Maintenance   Topic Date Due    Annual Wellness Visit (AWV)  06/19/2019    Potassium monitoring  11/19/2020    Creatinine monitoring  11/19/2020    DTaP/Tdap/Td vaccine (3 - Td) 05/24/2029    Flu vaccine  Completed    Shingles Vaccine  Completed    Pneumococcal 65+ years Vaccine  Completed     Recommendations for Preventive Services Due: see orders and patient instructions/AVS.  . Recommended screening schedule for the next 5-10 years is provided to the patient in written form: see Patient Jovanna Hernandez was seen today for medicare awv and discuss labs. Diagnoses and all orders for this visit:    Recurrent UTI  -     nitrofurantoin (MACRODANTIN) 25 MG capsule; Take 1 capsule by mouth 4 times daily for 14 days  -     Urine Culture; Future  -     Urine Culture;  Future

## 2020-01-13 NOTE — PROGRESS NOTES
[facility]  Dept: [phone]  Dept Fax: [phone]  Loc: [phone]    Lovbeto Enrique, [address] [place] [address] Urology Office Note -  Follow-up Patient    Patient:  Cole Martin  YOB: 1921  Date: 1/18/2020    The patient is a 80 y.o. female who presents today for evaluation of the following problems:  recurrent UTIs, history of kidney stones  Chief Complaint   Patient presents with    Urinary Tract Infection     1 year     referred/consultation requested by Jaunita Escalante MD.    HISTORY OF PRESENT ILLNESS:     Recurrent UTI  Onset was   Years ago  Overall, the problem(s) are worse  Severity is described as moderate  Associated Symptoms: No dysuria, no gross hematuria. Current Pain Severity: 0     Had recent UTI on prophylaxis. Now resistant to this medication. Currently asymptomatic.     presents today with no new issues. Denies symptoms. Urinary tract infections are recurrent but have not happened since last office visit. On prophylactic abx. The patient also complains of associated stress urinary incontinence. Overall feeling well. Summary of Previous Records:  Had sepsis in [place] in February 2014 secondary to obstructing stone and was stented at that time--had c diff afterwards. 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  I recently performed ureteroscopy for an obstructing UVJ stone. Requested/reviewed records from Juanita Escalante MD office and/or outside physician/EMR    (Patient's old records have been requested, reviewed and pertinent findings summarized in today's note.)    Procedures Today: N/A    Last several PSA's:  No results found for: PSA    Last total testosterone:  No results found for: TESTOSTERONE    Urinalysis today:  No results found for this visit on 01/13/20.     Last BUN and creatinine:  Lab Results   Component Value Date    BUN 22 (H) 12/02/2017 by mouth 2 times daily      aspirin 81 MG tablet Take 81 mg by mouth daily      Prosthesis MISC 1 Units by Does not apply route Indications: Bilateral Breast Prostetics with bra 1 each 1       Lotrel [amlodipine besy-benazepril hcl]  Social History     Tobacco Use   Smoking Status Never Smoker   Smokeless Tobacco Never Used      (If patient a smoker, smoking cessation counseling offered)   Social History     Substance and Sexual Activity   Alcohol Use No       REVIEW OF SYSTEMS:  Constitutional: negative  Eyes: negative  Respiratory: negative  Cardiovascular: negative  Gastrointestinal: negative  Genitourinary: see HPI  Musculoskeletal: negative  Skin: negative   Neurological: negative  Hematological/Lymphatic: negative  Psychological: negative        Physical Exam:    This a 80 y.o. female  Vitals:    01/13/20 1055   BP: 108/64   Pulse: 79     Body mass index is 24.77 kg/m². Constitutional: Patient in no acute distress; wheelchair bound  Neuro: alert and oriented to person place and time. Psych: Mood and affect normal.        Assessment and Plan        1. History of kidney stones    2. Recurrent UTI    3. Stress incontinence               Plan:      Now resistant to bactrim  Finish abx course  Stop prophylactic course  No urinary stymptoms other than foul smelling urine  Follow up in six months with KUB  Cont cranberry juice      Prescriptions Ordered:  No orders of the defined types were placed in this encounter.      Orders Placed:  Orders Placed This Encounter   Procedures    XR Abdomen Kub 1 VW     Standing Status:   Future     Standing Expiration Date:   1/13/2021     Order Specific Question:   Reason for exam:     Answer:   Kidney/ureteral stones            Michael Saunders MD

## 2020-02-01 PROBLEM — Z00.00 ROUTINE GENERAL MEDICAL EXAMINATION AT A HEALTH CARE FACILITY: Status: RESOLVED | Noted: 2020-01-01 | Resolved: 2020-01-01

## 2020-02-03 PROBLEM — M21.611 BUNION, RIGHT FOOT: Status: ACTIVE | Noted: 2020-01-01

## 2020-02-03 PROBLEM — I48.0 PAROXYSMAL A-FIB (HCC): Status: ACTIVE | Noted: 2020-01-01

## 2020-02-03 NOTE — PROGRESS NOTES
[address] [address]  Dept: [phone]  Dept Fax: [phone]    Cuong Greene is a 80 y.o. female who presents today for her medical conditions/complaintsas noted below. Cuong Greene c/o of Fall and Bunions      HPI:     HPI  Pt here for concerns of bunions. Right foot issues ongoing for several weeks. Had fall over weekend- \"soft fall\" and did not get evaluated through ER. No current significant complaints noted. Saida Melendez hitting left side on chair on porch    3rd concern is for another seeing elevated BP with some speech/cognition issue short term- less than 24 hours. Abdomen has been feeling better per pt. Dr Caleb Bajwa removed prophylactic antibiotic. BP Readings from Last 3 Encounters:   02/03/20 90/64   01/13/20 108/64   01/02/20 90/64          (goal 120/80)    Past Medical History:   Diagnosis Date    Cancer ([facility]     bilat mastectomy    Congenital heart disease     Hyperlipidemia     Hypertension       Past Surgical History:   Procedure Laterality Date    CHOLECYSTECTOMY      FRACTURE SURGERY      sacrum    JOINT REPLACEMENT      MASTECTOMY, BILATERAL      [place] CYSTOSCOPY,INSERT URETERAL STENT Right 4/24/2018    Cysto RIGHT  ureteroscopy RIGHT STENTPLACEMENT  stone BASKETING performed by Reinaldo Brand MD at [place] [address] History   Problem Relation Age of Onset    Cancer Mother     High Blood Pressure Mother     Cancer Father     Cancer Paternal Grandmother        Social History     Tobacco Use    Smoking status: Never Smoker    Smokeless tobacco: Never Used   Substance Use Topics    Alcohol use: No      Prior to Visit Medications    Medication Sig Taking?  Authorizing Provider   rivastigmine (EXELON) 1.5 MG capsule Take 1 capsule by mouth 2 times daily  Adelina Gutierrez MD   losartan (COZAAR) 50 MG tablet TAKE 1 TABLET TWICE DAILY  Adelina Gutierrez MD   metoprolol succinate (TOPROL XL) 50 MG extended release tablet Take Normocephalic. Cardiovascular:      Rate and Rhythm: Normal rate and regular rhythm. Heart sounds: Murmur (2/6 murmur) present. Pulmonary:      Effort: Pulmonary effort is normal. No respiratory distress. Breath sounds: No wheezing or rhonchi. Musculoskeletal:         General: Tenderness (forearm and upper arm not including shoulder) present. No deformity. Lymphadenopathy:      Cervical: No cervical adenopathy. Skin:     Findings: Lesion (right dorsal 1st MTP 3 x 5 mm ulceration noted without surrounding erythema) present. Neurological:      Mental Status: She is alert. Assessment:     1. Bunion, right foot    2. Left arm pain    3. At high risk for falls    4. Recurrent UTI    5. Essential hypertension    6. Paroxysmal A-fib ([address]      No results found for this visit on 02/03/20. Plan:     Orders Placed This Encounter   Procedures    XR RADIUS ULNA LEFT (2 VIEWS)     Standing Status:   Future     Number of Occurrences:   1     Standing Expiration Date:   2/3/2021    XR HUMERUS LEFT (MIN 2 VIEWS)     Standing Status:   Future     Number of Occurrences:   1     Standing Expiration Date:   2/3/2021   [facility] DPM, Podiatry, [place]     Referral Priority:   Routine     Referral Type:   Eval and Treat     Referral Reason:   Specialty Services Required     Referred to Provider:   Divya Frias DPM     Requested Specialty:   Podiatry     Number of Visits Requested:   1           No follow-ups on file. Patient Instructions   For future concerns for UTI recommend contacting Dr Nadine Martinez directly if able regarding medications. Discussed use, benefit, and side effects of prescribed medications. All patient questions answered. Pt voiced understanding. Instructed to continue current medications, diet and exercise. Patient agreed with treatment plan. Follow up as directed.      Electronically signed by Wily Dykes MD on 2/3/2020

## 2020-02-19 NOTE — PROGRESS NOTES
palpation bilateral. Normal medial longitudinal arch, bilateral.  Ankle ROM decreasd,bilateral.  1st MPJ ROM within normal limits, bilateral.  Dorsally contracted digits absent. Bunion R. No other foot deformities. Integument:  Open lesion present, Right. Ulcer dorsal medial R bunion, 0,3x0.2x0.2cm,  positive fibrin, no probing no undermining no malodor. No surrounding signs of infx. Interdigital maceration absent to web spaces , Bilateral.  Nails b/l thickened, dystrophic and crumbly, discolored with subungual debris. Fissures absent, Bilateral. Hyperkeratotic tissue is absent. Asessment: Patient is a 80 y.o. female with:    Diagnosis Orders   1. Skin ulcer of right foot, limited to breakdown of skin ([facility])  WA DEBRIDEMENT OPEN WOUND 20 SQ CM<   2. Bunion, right foot     3. PVD (peripheral vascular disease) ([facility])  WA DEBRIDEMENT OPEN WOUND 20 SQ CM<       Ulcer Classification:  Full thickness grade 1 C. IDSA  no infection. Plan:   Patient examined and evaluated. Current condition and treatment options discussed in detail. Topical lidocaine applied to wound. Active wound management took place 100% non excisional with the use of  tissue nippers. All non viable tissue (including epidermis and/or dermis) and bio burden was removed to promote healing. Bleeding was present. Please see chart for exact measurements, if not documented then size was less than 20 sq cm. Patient related pain absent post debridement. Orders Placed This Encounter   Medications    silver sulfADIAZINE (SILVADENE) 1 % cream     Sig: Apply topically daily. Dispense:  30 g     Refill:  1   appropriate shoe gear discussed. Pt will need normal width shoe R foot, she has gotten narrow shoes for years. Advised pt to wear sandals she has at home to offload. Pt would feel more comfortable getting around.   She was given option of sx shoe to offload  Pt will go to her shoe store to get R shoe stretched  Bunion condition discussed with pt. Appropriate offloading pads, wider width shoe gear, and OTC anti inflammatories were all discussed. Contact clinic with any questions/problems/concerns. Follow-up at [facility] in 2 week(s).

## 2020-05-04 NOTE — PROGRESS NOTES
Subjective:      Patient ID: Miriam Iverson is a 80 y.o. female. Last night she fell out of bed and obtained a skin tear on her right forearm  Has mild pain  Has questions regarding her bunion on her right foot      Review of Systems   Musculoskeletal: Positive for arthralgias. Objective:   Physical Exam  Vitals signs and nursing note reviewed. Cardiovascular:      Rate and Rhythm: Normal rate. Pulmonary:      Effort: Pulmonary effort is normal.   Musculoskeletal:      Comments: Her right great toe has a bunion and some callous formation no evidence of infection noted     Skin:     Comments: Has a 6 cm skin tear on her right forearm  No evidence of infection  The flap was a torn in several spots  I put steri strips to approximate the wound edge  Applied a small amount of bacitracin on a non adherent dressing   Wrapped with coban           Assessment:      Skin tear right forearm  Bunions       Plan:       To monitor skin lesion  Check wound twice a day  Change dressing as needed  To notify me if problems  The bunion at this time would continue current care         Zach Cornejo MD

## 2020-05-04 NOTE — PATIENT INSTRUCTIONS
immediate medical care if:    · You have signs of infection, such as:  ? Increased pain, swelling, warmth, or redness around the tear. ? Red streaks leading from the tear. ? Pus draining from the tear. ? A fever.     · The tear starts to bleed a lot. Small amounts of blood are normal.    Watch closely for changes in your health, and be sure to contact your doctor if:    · You do not get better as expected. Where can you learn more? Go to https://[email or web address]. org and sign in to your [employer] account. Enter K631 in the Punch! box to learn more about \"Skin Tears: Care Instructions. \"     If you do not have an account, please click on the \"Sign Up Now\" link. Current as of: June 26, 2019Content Version: 12.4  © 7033-8889 [employer]wise, Incorporated. Care instructions adapted under license by [facility] ([place] [facility]). If you have questions about a medical condition or this instruction, always ask your healthcare professional. [address] any warranty or liability for your use of this information.

## 2020-05-28 PROBLEM — R60.0 PEDAL EDEMA: Status: ACTIVE | Noted: 2020-01-01

## 2020-05-28 PROBLEM — R06.02 SOB (SHORTNESS OF BREATH): Status: ACTIVE | Noted: 2020-01-01

## 2020-05-28 PROBLEM — L98.9 SKIN LESION: Status: ACTIVE | Noted: 2020-01-01

## 2020-06-01 NOTE — TELEPHONE ENCOUNTER
From: Emily Tran  To: Shandra Collins MD  Sent: 5/30/2020 8:39 AM EDT  Subject: Non-Urgent Medical Question    Good morning. I forgot to ask at our last appointment, if I could get a prescription to get mom a wheelchair. We would like to get a light-weight one for transporting her. Please let me know if this is possible. We would probably get it at the medical supply office in [place] if that is ok. Thank you for taking such good care of her.  Corrina

## 2020-06-08 NOTE — TELEPHONE ENCOUNTER
From: Zane Stearns  To: Rhonda Bazzi MD  Sent: 6/8/2020 8:19 AM EDT  Subject: Non-Urgent Medical Question    Good morning. You put mom on a water pill to help with her breathing and to help her swelling feet. We have not seen any difference in either. Not sure what you recommend. Also the new antibiotic seems to be working. Her bottom looks so much better. Thank you for your guidance.  Orville Carrillo

## 2020-06-12 NOTE — TELEPHONE ENCOUNTER
From: Yadiel Sanchez  To: Jody Dean MD  Sent: 6/12/2020 10:00 AM EDT  Subject: Non-Urgent Medical Question    Still waiting for direction on water pill. Should we continue with what is prescribed. Thank you.

## 2020-06-15 NOTE — TELEPHONE ENCOUNTER
From: Alberto Milton  To: Carolee Lovell MD  Sent: 6/15/2020 10:12 AM EDT  Subject: Prescription Question    Good morning. We received moms losartan and it reads take 1 tablet twice a day. I believe we decided to cut those in half and take 1/2 pill twice a day. Please confirm . Also waiting for answer on water pill. Thank you.

## 2020-07-06 NOTE — TELEPHONE ENCOUNTER
Lisa Calderón is requesting a refill on the following medication(s):  Requested Prescriptions     Pending Prescriptions Disp Refills    metoprolol succinate (TOPROL XL) 50 MG extended release tablet 90 tablet 1     Sig: Take 0.5 tablets by mouth 2 times daily       Last Visit Date (If Applicable):  18/8/8971    Next Visit Date:    Visit date not found

## 2020-07-06 NOTE — TELEPHONE ENCOUNTER
Chloe Will is requesting a refill on the following medication(s):  Requested Prescriptions     Pending Prescriptions Disp Refills    metoprolol succinate (TOPROL XL) 50 MG extended release tablet 90 tablet 1     Sig: Take 0.5 tablets by mouth 2 times daily       Last Visit Date (If Applicable):  3/94/5378    Next Visit Date:    Visit date not found

## 2020-07-07 NOTE — TELEPHONE ENCOUNTER
Ever Maurice called back, did take [facility] to ED, [facility]. Had low Oxygen sent home with oxygen, and CHF. Pts daughter will call back to set up f/u apt.

## 2020-07-07 NOTE — TELEPHONE ENCOUNTER
Bebeto from [facility] called back. ED cannot order the o2, patient is paying OOP and will have the concentrator tonight, does not want to spend the night. Will call to schedule visit with PCP in the next week or two.

## 2020-07-07 NOTE — TELEPHONE ENCOUNTER
Jimbo Me [facility] [phone] Zoila's Daughter    States she is very sluggish, she constantly just wants to sleep and her breathing seems labored. Has noticed leg swelling and her abd seems somewhat larger, feels she is full of fluids. Did go to a urology appt yesterday. VS are checked at home daily. BP was 111/89 and her P was 79 this morning. Does not have a pulse ox. Writer advised that she should be seen for evaluation, advised that she should be taken to the ED. Asked if a squad was needed, daughter informed that they are perfectly capable getting her to the ED via personal auto transport. Would like to monitor her for a few hours and see how she does. Would like to know about having home O2 and if Dr. Denis Lizarraga could write an order?

## 2020-07-08 NOTE — TELEPHONE ENCOUNTER
Patient's daughter reports patient has been diagnosed with CHF and is now on oxygen 24/7. She canceled patient's appointment on 7/13/2020 because it is difficult to get patient to the clinic because of the new diagnosis. She also wanted to let the office know she dropped off a urine sample to [facility] for the UA that was ordered on 7/6/2020.

## 2020-07-08 NOTE — TELEPHONE ENCOUNTER
ER reports noted in chart this am state per respiratory testing pt qualifies for home O2 though this testing is not included in the 8 pages of ER notes therefore I can not complete the needed paperwork for home O2 until that is added to the chart from respiratory department from the hospital.   Thanks

## 2020-07-08 NOTE — TELEPHONE ENCOUNTER
NETO Tate from [facility] called, stating I heard from Zoila's daughter this morning, we would like to go through community health professionals for palliative care, you should be hearing from them.

## 2020-07-13 NOTE — TELEPHONE ENCOUNTER
From: Zane Stearns  To: Rhonda Bazzi MD  Sent: 7/5/2020 6:54 PM EDT  Subject: Prescription Question    I have sent you a request for Metoprolol for mom. Please send the prescription in to [facility] asap. Also I had sent you a question about her Losarten strength and also about her pills for swelling. Im still waiting for a reply on the last two questions. Thank you.

## 2020-07-19 NOTE — PROGRESS NOTES
[facility]  Dept: [phone]  Dept Fax: [phone]  Loc: [phone]    Ryan Brito, [address] [place] [address] Urology Office Note -  Follow-up Patient    Patient:  Erin Orr  YOB: 1921  Date: 7/19/2020    The patient is a 80 y.o. female who presents today for evaluation of the following problems:  recurrent UTIs, history of kidney stones  Chief Complaint   Patient presents with    Flank Pain     right side flank pain    referred/consultation requested by Tre Fierro MD.    HISTORY OF PRESENT ILLNESS:     Recurrent UTI  Onset was   Years ago  Overall, the problem(s) are better  Severity is described as moderate  Associated Symptoms: No dysuria, no gross hematuria. Current Pain Severity: 2    Had recent UTI on prophylaxis. Now resistant to this medication. Currently asymptomatic.   presents today with mild right flank pain. Denies other symptoms. Urinary tract infections are recurrent but have not happened since last office visit. The patient also complains of associated stress urinary incontinence      Secondary Diagnosis:    Hx of kidney stones- here with KUB today no new stones. Summary of Previous Records:  Had sepsis in [place] in February 2014 secondary to obstructing stone and was stented at that time--had c diff afterwards. 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  I recently performed ureteroscopy for an obstructing UVJ stone. Requested/reviewed records from Tre Fierro MD office and/or outside physician/EMR    (Patient's old records have been requested, reviewed and pertinent findings summarized in today's note.)    Procedures Today: N/A    Last several PSA's:  No results found for: PSA    Last total testosterone:  No results found for: TESTOSTERONE    Urinalysis today:  No results found for this visit on 07/06/20.     Last BUN and creatinine:  Lab Results   Component Value Date    BUN 31 (H) 07/07/2020     Lab Results   Component Value Date    CREATININE 1.4 (H) 07/07/2020       Additional Lab/Culture results:         Imaging Reviewed during this Office Visit:   London Orantes MD independently reviewed the images and verified the radiology reports from:    KUB- no new stones    PAST MEDICAL, FAMILY AND SOCIAL HISTORY:  Past Medical History:   Diagnosis Date    Cancer ([address]     bilat mastectomy    Congenital heart disease     Hyperlipidemia     Hypertension      Past Surgical History:   Procedure Laterality Date    CHOLECYSTECTOMY      FRACTURE SURGERY      sacrum    JOINT REPLACEMENT      MASTECTOMY, BILATERAL      OR CYSTOSCOPY,INSERT URETERAL STENT Right 4/24/2018    Cysto RIGHT  ureteroscopy RIGHT STENTPLACEMENT  stone BASKETING performed by Zach Dee MD at [facility] OR     Family History   Problem Relation Age of Onset    Cancer Mother     High Blood Pressure Mother     Cancer Father     Cancer Paternal Grandmother      Outpatient Medications Marked as Taking for the 7/6/20 encounter (Office Visit) with Zach Dee MD   Medication Sig Dispense Refill    hydroCHLOROthiazide (MICROZIDE) 12.5 MG capsule Take 1 capsule by mouth every morning 30 capsule 3    losartan (COZAAR) 50 MG tablet TAKE 1 TABLET TWICE A  tablet 0    omeprazole (PRILOSEC) 20 MG delayed release capsule Take 1 capsule by mouth Daily 90 capsule 1    DELZICOL 400 MG CPDR delayed release capsule TAKE 2 CAPSULES THREE TIMES A  capsule 1    rivastigmine (EXELON) 1.5 MG capsule Take 1 capsule by mouth 2 times daily 180 capsule 1    [DISCONTINUED] metoprolol succinate (TOPROL XL) 50 MG extended release tablet Take 0.5 tablets by mouth 2 times daily 90 tablet 1    silver sulfADIAZINE (SILVADENE) 1 % cream Apply topically daily.  30 g 1    benzonatate (TESSALON) 100 MG capsule Take 100 mg by mouth 3 times daily as needed for Cough      diphenhydrAMINE-APAP, sleep, (TYLENOL PM EXTRA STRENGTH)  MG tablet Take 1 tablet by mouth nightly as needed for Sleep      Multiple Vitamins-Minerals (PRESERVISION AREDS 2 PO) Take by mouth 2 times daily      Naproxen Sodium (ALEVE) 220 MG CAPS Take 220 mg by mouth 2 times daily      aspirin 81 MG tablet Take 81 mg by mouth daily      Prosthesis MISC 1 Units by Does not apply route Indications: Bilateral Breast Prostetics with bra 1 each 1       Lotrel [amlodipine besy-benazepril hcl]  Social History     Tobacco Use   Smoking Status Never Smoker   Smokeless Tobacco Never Used      (If patient a smoker, smoking cessation counseling offered)   Social History     Substance and Sexual Activity   Alcohol Use No       REVIEW OF SYSTEMS:  Constitutional: negative  Eyes: negative  Respiratory: negative  Cardiovascular: negative  Gastrointestinal: negative  Genitourinary: see HPI  Musculoskeletal: negative  Skin: negative   Neurological: negative  Hematological/Lymphatic: negative  Psychological: negative        Physical Exam:    This a 80 y.o. female  Vitals:    07/06/20 1347   BP: 114/62   Pulse: 60     There is no height or weight on file to calculate BMI. Constitutional: Patient in no acute distress; wheelchair bound  Neuro: alert and oriented to person place and time. Psych: Mood and affect normal.        Assessment and Plan        1. Right flank pain    2. Recurrent UTI    3. Incomplete bladder emptying    4. Stress incontinence               Plan:       No active  issues  Recurrent UTI- resistant to bactrim  Cont cranberry juice  Urine culture today  Follow up in six months      Prescriptions Ordered:  No orders of the defined types were placed in this encounter.      Orders Placed:  Orders Placed This Encounter   Procedures   Linda Perry MD

## (undated) DEVICE — Z DUP USE 2565107 PACK SURG PROC LEG CYSTO T-DRAPE REINF TBL CVR HND TWL

## (undated) DEVICE — STRIP,CLOSURE,WOUND,MEDI-STRIP,1/2X4: Brand: MEDLINE

## (undated) DEVICE — Z INACTIVE USE 2660664 SOLUTION IRRIG 3000ML 0.9% SOD CHL USP UROMATIC PLAS CONT

## (undated) DEVICE — GOWN,AURORA,NONRNF,XL,30/CS: Brand: MEDLINE

## (undated) DEVICE — GLOVE ORANGE PI 7 1/2   MSG9075

## (undated) DEVICE — POUCH DRNGE FLX BND INTEGR RAIL CLMP DISP EZ CTCH

## (undated) DEVICE — SOLUTION SCRB 4OZ 4% CHG CLN BASE FOR PT SKIN ANTISEPSIS

## (undated) DEVICE — SOLUTION IV IRRIG WATER 1000ML POUR BRL 2F7114

## (undated) DEVICE — CYSTO/BLADDER IRRIGATION SET, REGULATING CLAMP

## (undated) DEVICE — GOWN,AURORA,NONREINFORCED,LARGE: Brand: MEDLINE

## (undated) DEVICE — GUIDEWIRE URO L150CM DIA0.035IN STIFF NIT HYDRPHLC STR TIP

## (undated) DEVICE — 35 ML SYRINGE LUER-LOCK TIP: Brand: MONOJECT

## (undated) DEVICE — STONE RETRIEVAL BASKET: Brand: SEGURA HEMISPHERE

## (undated) DEVICE — SINGLE ACTION PUMPING SYSTEM

## (undated) DEVICE — JELLY LUBRICATING 4OZ FLIP TOP TB E Z